# Patient Record
Sex: MALE | Race: WHITE | Employment: OTHER | URBAN - METROPOLITAN AREA
[De-identification: names, ages, dates, MRNs, and addresses within clinical notes are randomized per-mention and may not be internally consistent; named-entity substitution may affect disease eponyms.]

---

## 2024-05-30 ENCOUNTER — HOSPITAL ENCOUNTER (OUTPATIENT)
Dept: PET IMAGING | Age: 64
Discharge: HOME | End: 2024-05-30
Payer: OTHER GOVERNMENT

## 2024-05-30 DIAGNOSIS — C82.90 FOLLICULAR LYMPHOMA, UNSPECIFIED FOLLICULAR LYMPHOMA TYPE, UNSPECIFIED BODY REGION (HCC): ICD-10-CM

## 2024-05-30 PROCEDURE — 78815 PET IMAGE W/CT SKULL-THIGH: CPT

## 2024-05-30 PROCEDURE — A9609 HC RX DIAGNOSTIC RADIOPHARMACEUTICAL: HCPCS | Performed by: INTERNAL MEDICINE

## 2024-05-30 PROCEDURE — 2580000003 HC RX 258: Performed by: INTERNAL MEDICINE

## 2024-05-30 PROCEDURE — 3430000000 HC RX DIAGNOSTIC RADIOPHARMACEUTICAL: Performed by: INTERNAL MEDICINE

## 2024-05-30 RX ORDER — SODIUM CHLORIDE 0.9 % (FLUSH) 0.9 %
10 SYRINGE (ML) INJECTION PRN
Status: COMPLETED | OUTPATIENT
Start: 2024-05-30 | End: 2024-05-30

## 2024-05-30 RX ORDER — FLUDEOXYGLUCOSE F 18 200 MCI/ML
14.06 INJECTION, SOLUTION INTRAVENOUS
Status: COMPLETED | OUTPATIENT
Start: 2024-05-30 | End: 2024-05-30

## 2024-05-30 RX ADMIN — SODIUM CHLORIDE, PRESERVATIVE FREE 10 ML: 5 INJECTION INTRAVENOUS at 08:45

## 2024-05-30 RX ADMIN — FLUDEOXYGLUCOSE F 18 14.06 MILLICURIE: 200 INJECTION, SOLUTION INTRAVENOUS at 08:45

## 2025-02-12 DIAGNOSIS — R82.90 ABNORMAL URINE FINDING: ICD-10-CM

## 2025-02-12 DIAGNOSIS — I34.0 MITRAL VALVE INSUFFICIENCY, UNSPECIFIED ETIOLOGY: Primary | ICD-10-CM

## 2025-02-12 DIAGNOSIS — R07.9 CHEST PAIN, UNSPECIFIED TYPE: ICD-10-CM

## 2025-02-13 ENCOUNTER — HOSPITAL ENCOUNTER (OUTPATIENT)
Facility: HOSPITAL | Age: 65
Setting detail: SURGERY ADMIT
End: 2025-02-13
Attending: STUDENT IN AN ORGANIZED HEALTH CARE EDUCATION/TRAINING PROGRAM | Admitting: STUDENT IN AN ORGANIZED HEALTH CARE EDUCATION/TRAINING PROGRAM
Payer: OTHER GOVERNMENT

## 2025-02-13 DIAGNOSIS — I34.0 SEVERE MITRAL REGURGITATION: Primary | ICD-10-CM

## 2025-03-12 ENCOUNTER — HOSPITAL ENCOUNTER (INPATIENT)
Facility: HOSPITAL | Age: 65
DRG: 216 | End: 2025-03-12
Attending: STUDENT IN AN ORGANIZED HEALTH CARE EDUCATION/TRAINING PROGRAM | Admitting: STUDENT IN AN ORGANIZED HEALTH CARE EDUCATION/TRAINING PROGRAM
Payer: OTHER GOVERNMENT

## 2025-03-12 ENCOUNTER — APPOINTMENT (OUTPATIENT)
Dept: CARDIOLOGY | Facility: HOSPITAL | Age: 65
DRG: 216 | End: 2025-03-12
Payer: OTHER GOVERNMENT

## 2025-03-12 ENCOUNTER — APPOINTMENT (OUTPATIENT)
Dept: RADIOLOGY | Facility: HOSPITAL | Age: 65
DRG: 216 | End: 2025-03-12
Payer: OTHER GOVERNMENT

## 2025-03-12 DIAGNOSIS — Z98.890 S/P MVR (MITRAL VALVE REPAIR): ICD-10-CM

## 2025-03-12 DIAGNOSIS — I34.89 OTHER NONRHEUMATIC MITRAL VALVE DISORDERS: ICD-10-CM

## 2025-03-12 DIAGNOSIS — I34.0 MITRAL VALVE INSUFFICIENCY, UNSPECIFIED ETIOLOGY: ICD-10-CM

## 2025-03-12 DIAGNOSIS — Z01.810 ENCOUNTER FOR PREPROCEDURAL CARDIOVASCULAR EXAMINATION: ICD-10-CM

## 2025-03-12 DIAGNOSIS — I34.0 SEVERE MITRAL REGURGITATION: Primary | ICD-10-CM

## 2025-03-12 PROCEDURE — 1200000002 HC GENERAL ROOM WITH TELEMETRY DAILY

## 2025-03-12 PROCEDURE — 70355 PANORAMIC X-RAY OF JAWS: CPT | Performed by: RADIOLOGY

## 2025-03-12 PROCEDURE — 2500000005 HC RX 250 GENERAL PHARMACY W/O HCPCS: Performed by: NURSE PRACTITIONER

## 2025-03-12 PROCEDURE — 71046 X-RAY EXAM CHEST 2 VIEWS: CPT

## 2025-03-12 PROCEDURE — 2500000005 HC RX 250 GENERAL PHARMACY W/O HCPCS

## 2025-03-12 PROCEDURE — 99223 1ST HOSP IP/OBS HIGH 75: CPT | Performed by: NURSE PRACTITIONER

## 2025-03-12 PROCEDURE — 87081 CULTURE SCREEN ONLY: CPT | Performed by: NURSE PRACTITIONER

## 2025-03-12 PROCEDURE — 93005 ELECTROCARDIOGRAM TRACING: CPT

## 2025-03-12 PROCEDURE — 93010 ELECTROCARDIOGRAM REPORT: CPT | Performed by: INTERNAL MEDICINE

## 2025-03-12 PROCEDURE — 70355 PANORAMIC X-RAY OF JAWS: CPT

## 2025-03-12 PROCEDURE — 2500000004 HC RX 250 GENERAL PHARMACY W/ HCPCS (ALT 636 FOR OP/ED)

## 2025-03-12 RX ORDER — ENOXAPARIN SODIUM 100 MG/ML
40 INJECTION SUBCUTANEOUS EVERY 24 HOURS
Status: COMPLETED | OUTPATIENT
Start: 2025-03-13 | End: 2025-03-16

## 2025-03-12 RX ORDER — LANOLIN ALCOHOL/MO/W.PET/CERES
1000 CREAM (GRAM) TOPICAL DAILY
Status: DISCONTINUED | OUTPATIENT
Start: 2025-03-13 | End: 2025-03-18

## 2025-03-12 RX ORDER — CHOLECALCIFEROL (VITAMIN D3) 25 MCG
50 TABLET ORAL DAILY
COMMUNITY
Start: 2025-02-03

## 2025-03-12 RX ORDER — ACETAMINOPHEN 325 MG/1
650 TABLET ORAL EVERY 6 HOURS PRN
Status: DISCONTINUED | OUTPATIENT
Start: 2025-03-12 | End: 2025-03-18

## 2025-03-12 RX ORDER — DOCUSATE SODIUM 100 MG/1
100 CAPSULE, LIQUID FILLED ORAL 2 TIMES DAILY
Status: DISCONTINUED | OUTPATIENT
Start: 2025-03-12 | End: 2025-03-18

## 2025-03-12 RX ORDER — LANOLIN ALCOHOL/MO/W.PET/CERES
1000 CREAM (GRAM) TOPICAL DAILY
COMMUNITY
Start: 2025-02-11

## 2025-03-12 RX ORDER — TALC
3 POWDER (GRAM) TOPICAL NIGHTLY PRN
Status: DISCONTINUED | OUTPATIENT
Start: 2025-03-12 | End: 2025-03-18

## 2025-03-12 RX ORDER — MUPIROCIN 20 MG/G
0.5 OINTMENT TOPICAL 2 TIMES DAILY
Status: COMPLETED | OUTPATIENT
Start: 2025-03-12 | End: 2025-03-17

## 2025-03-12 RX ORDER — POLYETHYLENE GLYCOL 3350 17 G/17G
17 POWDER, FOR SOLUTION ORAL DAILY PRN
Status: DISCONTINUED | OUTPATIENT
Start: 2025-03-12 | End: 2025-03-18

## 2025-03-12 RX ORDER — CHOLECALCIFEROL (VITAMIN D3) 25 MCG
1000 TABLET ORAL DAILY
Status: DISCONTINUED | OUTPATIENT
Start: 2025-03-13 | End: 2025-03-18

## 2025-03-12 RX ADMIN — MUPIROCIN 0.5 APPLICATION: 20 OINTMENT TOPICAL at 20:47

## 2025-03-12 SDOH — ECONOMIC STABILITY: HOUSING INSECURITY: AT ANY TIME IN THE PAST 12 MONTHS, WERE YOU HOMELESS OR LIVING IN A SHELTER (INCLUDING NOW)?: NO

## 2025-03-12 SDOH — SOCIAL STABILITY: SOCIAL INSECURITY: WITHIN THE LAST YEAR, HAVE YOU BEEN HUMILIATED OR EMOTIONALLY ABUSED IN OTHER WAYS BY YOUR PARTNER OR EX-PARTNER?: NO

## 2025-03-12 SDOH — ECONOMIC STABILITY: FOOD INSECURITY: WITHIN THE PAST 12 MONTHS, THE FOOD YOU BOUGHT JUST DIDN'T LAST AND YOU DIDN'T HAVE MONEY TO GET MORE.: NEVER TRUE

## 2025-03-12 SDOH — SOCIAL STABILITY: SOCIAL INSECURITY: DO YOU FEEL ANYONE HAS EXPLOITED OR TAKEN ADVANTAGE OF YOU FINANCIALLY OR OF YOUR PERSONAL PROPERTY?: NO

## 2025-03-12 SDOH — SOCIAL STABILITY: SOCIAL INSECURITY
WITHIN THE LAST YEAR, HAVE YOU BEEN KICKED, HIT, SLAPPED, OR OTHERWISE PHYSICALLY HURT BY YOUR PARTNER OR EX-PARTNER?: NO

## 2025-03-12 SDOH — SOCIAL STABILITY: SOCIAL INSECURITY: DO YOU FEEL UNSAFE GOING BACK TO THE PLACE WHERE YOU ARE LIVING?: NO

## 2025-03-12 SDOH — SOCIAL STABILITY: SOCIAL INSECURITY: HAS ANYONE EVER THREATENED TO HURT YOUR FAMILY OR YOUR PETS?: NO

## 2025-03-12 SDOH — ECONOMIC STABILITY: HOUSING INSECURITY: IN THE PAST 12 MONTHS, HOW MANY TIMES HAVE YOU MOVED WHERE YOU WERE LIVING?: 0

## 2025-03-12 SDOH — ECONOMIC STABILITY: FOOD INSECURITY: HOW HARD IS IT FOR YOU TO PAY FOR THE VERY BASICS LIKE FOOD, HOUSING, MEDICAL CARE, AND HEATING?: NOT HARD AT ALL

## 2025-03-12 SDOH — SOCIAL STABILITY: SOCIAL INSECURITY: WITHIN THE LAST YEAR, HAVE YOU BEEN AFRAID OF YOUR PARTNER OR EX-PARTNER?: NO

## 2025-03-12 SDOH — SOCIAL STABILITY: SOCIAL INSECURITY
WITHIN THE LAST YEAR, HAVE YOU BEEN RAPED OR FORCED TO HAVE ANY KIND OF SEXUAL ACTIVITY BY YOUR PARTNER OR EX-PARTNER?: NO

## 2025-03-12 SDOH — SOCIAL STABILITY: SOCIAL INSECURITY: ARE YOU OR HAVE YOU BEEN THREATENED OR ABUSED PHYSICALLY, EMOTIONALLY, OR SEXUALLY BY ANYONE?: NO

## 2025-03-12 SDOH — ECONOMIC STABILITY: HOUSING INSECURITY: IN THE LAST 12 MONTHS, WAS THERE A TIME WHEN YOU WERE NOT ABLE TO PAY THE MORTGAGE OR RENT ON TIME?: NO

## 2025-03-12 SDOH — ECONOMIC STABILITY: INCOME INSECURITY: IN THE PAST 12 MONTHS HAS THE ELECTRIC, GAS, OIL, OR WATER COMPANY THREATENED TO SHUT OFF SERVICES IN YOUR HOME?: NO

## 2025-03-12 SDOH — SOCIAL STABILITY: SOCIAL INSECURITY: DOES ANYONE TRY TO KEEP YOU FROM HAVING/CONTACTING OTHER FRIENDS OR DOING THINGS OUTSIDE YOUR HOME?: NO

## 2025-03-12 SDOH — ECONOMIC STABILITY: FOOD INSECURITY: WITHIN THE PAST 12 MONTHS, YOU WORRIED THAT YOUR FOOD WOULD RUN OUT BEFORE YOU GOT THE MONEY TO BUY MORE.: NEVER TRUE

## 2025-03-12 SDOH — SOCIAL STABILITY: SOCIAL INSECURITY: ABUSE: ADULT

## 2025-03-12 SDOH — SOCIAL STABILITY: SOCIAL INSECURITY: WERE YOU ABLE TO COMPLETE ALL THE BEHAVIORAL HEALTH SCREENINGS?: YES

## 2025-03-12 SDOH — ECONOMIC STABILITY: TRANSPORTATION INSECURITY: IN THE PAST 12 MONTHS, HAS LACK OF TRANSPORTATION KEPT YOU FROM MEDICAL APPOINTMENTS OR FROM GETTING MEDICATIONS?: NO

## 2025-03-12 SDOH — SOCIAL STABILITY: SOCIAL INSECURITY: ARE THERE ANY APPARENT SIGNS OF INJURIES/BEHAVIORS THAT COULD BE RELATED TO ABUSE/NEGLECT?: NO

## 2025-03-12 SDOH — SOCIAL STABILITY: SOCIAL INSECURITY: HAVE YOU HAD ANY THOUGHTS OF HARMING ANYONE ELSE?: NO

## 2025-03-12 SDOH — SOCIAL STABILITY: SOCIAL INSECURITY: HAVE YOU HAD THOUGHTS OF HARMING ANYONE ELSE?: NO

## 2025-03-12 ASSESSMENT — ENCOUNTER SYMPTOMS
HEMATOLOGIC/LYMPHATIC NEGATIVE: 1
CARDIOVASCULAR NEGATIVE: 1
GASTROINTESTINAL NEGATIVE: 1
ABDOMINAL PAIN: 0
RESPIRATORY NEGATIVE: 1
PALPITATIONS: 0
PSYCHIATRIC NEGATIVE: 1
MUSCULOSKELETAL NEGATIVE: 1
EYES NEGATIVE: 1
ALLERGIC/IMMUNOLOGIC NEGATIVE: 1
FACIAL ASYMMETRY: 1
LIGHT-HEADEDNESS: 0
CONSTITUTIONAL NEGATIVE: 1
ENDOCRINE NEGATIVE: 1
DIZZINESS: 0
SHORTNESS OF BREATH: 0

## 2025-03-12 ASSESSMENT — COGNITIVE AND FUNCTIONAL STATUS - GENERAL
PATIENT BASELINE BEDBOUND: NO
DAILY ACTIVITIY SCORE: 24
MOBILITY SCORE: 24
MOBILITY SCORE: 24
DAILY ACTIVITIY SCORE: 24

## 2025-03-12 ASSESSMENT — ACTIVITIES OF DAILY LIVING (ADL)
HEARING - LEFT EAR: DIFFICULTY WITH NOISE
JUDGMENT_ADEQUATE_SAFELY_COMPLETE_DAILY_ACTIVITIES: YES
DRESSING YOURSELF: INDEPENDENT
ADEQUATE_TO_COMPLETE_ADL: YES
LACK_OF_TRANSPORTATION: NO
TOILETING: INDEPENDENT
HEARING - RIGHT EAR: FUNCTIONAL
PATIENT'S MEMORY ADEQUATE TO SAFELY COMPLETE DAILY ACTIVITIES?: YES
BATHING: INDEPENDENT
GROOMING: INDEPENDENT
WALKS IN HOME: INDEPENDENT
ASSISTIVE_DEVICE: EYEGLASSES
LACK_OF_TRANSPORTATION: NO
FEEDING YOURSELF: INDEPENDENT

## 2025-03-12 ASSESSMENT — LIFESTYLE VARIABLES
AUDIT-C TOTAL SCORE: 0
HOW OFTEN DO YOU HAVE A DRINK CONTAINING ALCOHOL: NEVER
HOW MANY STANDARD DRINKS CONTAINING ALCOHOL DO YOU HAVE ON A TYPICAL DAY: PATIENT DOES NOT DRINK
SKIP TO QUESTIONS 9-10: 1
AUDIT-C TOTAL SCORE: 0
HOW OFTEN DO YOU HAVE 6 OR MORE DRINKS ON ONE OCCASION: NEVER

## 2025-03-12 ASSESSMENT — PAIN SCALES - GENERAL
PAINLEVEL_OUTOF10: 0 - NO PAIN
PAINLEVEL_OUTOF10: 0 - NO PAIN

## 2025-03-12 ASSESSMENT — PATIENT HEALTH QUESTIONNAIRE - PHQ9
SUM OF ALL RESPONSES TO PHQ9 QUESTIONS 1 & 2: 0
2. FEELING DOWN, DEPRESSED OR HOPELESS: NOT AT ALL
1. LITTLE INTEREST OR PLEASURE IN DOING THINGS: NOT AT ALL

## 2025-03-12 ASSESSMENT — COLUMBIA-SUICIDE SEVERITY RATING SCALE - C-SSRS
6. HAVE YOU EVER DONE ANYTHING, STARTED TO DO ANYTHING, OR PREPARED TO DO ANYTHING TO END YOUR LIFE?: NO
2. HAVE YOU ACTUALLY HAD ANY THOUGHTS OF KILLING YOURSELF?: NO
1. IN THE PAST MONTH, HAVE YOU WISHED YOU WERE DEAD OR WISHED YOU COULD GO TO SLEEP AND NOT WAKE UP?: NO

## 2025-03-12 ASSESSMENT — PAIN - FUNCTIONAL ASSESSMENT: PAIN_FUNCTIONAL_ASSESSMENT: 0-10

## 2025-03-12 NOTE — CARE PLAN
Problem: Pain - Adult  Goal: Verbalizes/displays adequate comfort level or baseline comfort level  Outcome: Progressing     Problem: Safety - Adult  Goal: Free from fall injury  Outcome: Progressing     Problem: Discharge Planning  Goal: Discharge to home or other facility with appropriate resources  Outcome: Progressing     Problem: Chronic Conditions and Co-morbidities  Goal: Patient's chronic conditions and co-morbidity symptoms are monitored and maintained or improved  Outcome: Progressing     Problem: Nutrition  Goal: Nutrient intake appropriate for maintaining nutritional needs  Outcome: Progressing       The clinical goals for the shift include Patient will remain safe and free from falls throughout shift.  Renae Oshea RN

## 2025-03-12 NOTE — PROGRESS NOTES
Pharmacy Medication History Review    Rodney Mccabe is a 64 y.o. male admitted for Mitral regurgitation, acute. Pharmacy reviewed the patient's [id number] medications and allergies for accuracy.    Medications ADDED  All medications added to PTA list  Medications CHANGED  N/A  Medications REMOVED/NOT TAKING   N/A     The list below reflects the updated PTA list.   Prior to Admission Medications   Prescriptions Last Dose Informant   cholecalciferol (Vitamin D-3) 25 mcg (1000 units) tablet  Self   Sig: Take 2 tablets (50 mcg) by mouth once daily.   cyanocobalamin (Vitamin B-12) 1,000 mcg tablet  Self   Sig: Take 1 tablet (1,000 mcg) by mouth once daily.      Facility-Administered Medications: None       The list below reflects the updated allergy list. Please review each documented allergy for additional clarification and justification.  Allergies  Reviewed by Renae Oshea RN on 3/12/2025        Severity Reactions Comments    Benadryl [diphenhydramine Hcl] Not Specified Hives, Itching     Lactose Not Specified Diarrhea     Procaine Hcl Not Specified GI Upset, Nausea/vomiting             Patient declines M2B at discharge. Pharmacy has been updated to [facility].    Sources  [facility]  Pharmacy dispense history  Patient interview Good historian  Prescription bottles available at bedside for review  Care Everywhere   [facility]    Additional Comments  N/A    Rashid Woodruff, Destiny  [facility]  [id number]  [address], [address]  [place] [address]  Phone: [phone]  Please reach out via Secure Chat for questions, or if no response call iTaggit or Noknoker
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
## 2025-03-13 ENCOUNTER — APPOINTMENT (OUTPATIENT)
Dept: CARDIOLOGY | Facility: HOSPITAL | Age: 65
DRG: 216 | End: 2025-03-13
Payer: OTHER GOVERNMENT

## 2025-03-13 ENCOUNTER — APPOINTMENT (OUTPATIENT)
Dept: RESPIRATORY THERAPY | Facility: HOSPITAL | Age: 65
DRG: 216 | End: 2025-03-13
Payer: OTHER GOVERNMENT

## 2025-03-13 ENCOUNTER — APPOINTMENT (OUTPATIENT)
Dept: RADIOLOGY | Facility: HOSPITAL | Age: 65
DRG: 216 | End: 2025-03-13
Payer: OTHER GOVERNMENT

## 2025-03-13 ENCOUNTER — APPOINTMENT (OUTPATIENT)
Dept: VASCULAR MEDICINE | Facility: HOSPITAL | Age: 65
DRG: 216 | End: 2025-03-13
Payer: OTHER GOVERNMENT

## 2025-03-13 LAB
25(OH)D3 SERPL-MCNC: 50 NG/ML (ref 30–100)
ABO GROUP (TYPE) IN BLOOD: NORMAL
ALBUMIN SERPL BCP-MCNC: 4.1 G/DL (ref 3.4–5)
ALP SERPL-CCNC: 62 U/L (ref 33–136)
ALT SERPL W P-5'-P-CCNC: 11 U/L (ref 10–52)
ANION GAP SERPL CALC-SCNC: 13 MMOL/L (ref 10–20)
ANTIBODY SCREEN: NORMAL
AORTIC VALVE MEAN GRADIENT: 3 MMHG
AORTIC VALVE PEAK VELOCITY: 1.49 M/S
APPEARANCE UR: CLEAR
APTT PPP: 28 SECONDS (ref 26–36)
AST SERPL W P-5'-P-CCNC: 13 U/L (ref 9–39)
ATRIAL RATE: 65 BPM
AV PEAK GRADIENT: 9 MMHG
AVA (PEAK VEL): 2.32 CM2
AVA (VTI): 2.34 CM2
BASE EXCESS BLDA CALC-SCNC: 0 MMOL/L (ref -2–3)
BASOPHILS # BLD AUTO: 0.02 X10*3/UL (ref 0–0.1)
BASOPHILS NFR BLD AUTO: 0.6 %
BILIRUB SERPL-MCNC: 1.6 MG/DL (ref 0–1.2)
BILIRUB UR STRIP.AUTO-MCNC: NEGATIVE MG/DL
BODY TEMPERATURE: 37 DEGREES CELSIUS
BUN SERPL-MCNC: 14 MG/DL (ref 6–23)
CALCIUM SERPL-MCNC: 8.9 MG/DL (ref 8.6–10.6)
CHLORIDE SERPL-SCNC: 107 MMOL/L (ref 98–107)
CHOLEST SERPL-MCNC: 105 MG/DL (ref 0–199)
CHOLESTEROL/HDL RATIO: 3.3
CO2 SERPL-SCNC: 25 MMOL/L (ref 21–32)
COHGB MFR BLDA: 1.4 %
COLOR UR: COLORLESS
CREAT SERPL-MCNC: 0.78 MG/DL (ref 0.5–1.3)
DO-HGB MFR BLDA: 1.4 % (ref 0–5)
EGFRCR SERPLBLD CKD-EPI 2021: >90 ML/MIN/1.73M*2
EJECTION FRACTION APICAL 4 CHAMBER: 64.2
EJECTION FRACTION: 63 %
EOSINOPHIL # BLD AUTO: 0.1 X10*3/UL (ref 0–0.7)
EOSINOPHIL NFR BLD AUTO: 2.9 %
ERYTHROCYTE [DISTWIDTH] IN BLOOD BY AUTOMATED COUNT: 12.7 % (ref 11.5–14.5)
EST. AVERAGE GLUCOSE BLD GHB EST-MCNC: 82 MG/DL
GLUCOSE SERPL-MCNC: 83 MG/DL (ref 74–99)
GLUCOSE UR STRIP.AUTO-MCNC: NORMAL MG/DL
HBA1C MFR BLD: 4.5 %
HCO3 BLDA-SCNC: 23.9 MMOL/L (ref 22–26)
HCT VFR BLD AUTO: 40.8 % (ref 41–52)
HCV AB SER QL: REACTIVE
HDLC SERPL-MCNC: 31.9 MG/DL
HGB BLD-MCNC: 14.3 G/DL (ref 13.5–17.5)
HGB BLDA-MCNC: 15.3 G/DL (ref 13.5–17.5)
HOLD SPECIMEN: NORMAL
IMM GRANULOCYTES # BLD AUTO: 0 X10*3/UL (ref 0–0.7)
IMM GRANULOCYTES NFR BLD AUTO: 0 % (ref 0–0.9)
INR PPP: 1.1 (ref 0.9–1.1)
KETONES UR STRIP.AUTO-MCNC: NEGATIVE MG/DL
LDLC SERPL CALC-MCNC: 54 MG/DL
LEFT VENTRICLE INTERNAL DIMENSION DIASTOLE: 5.3 CM (ref 3.5–6)
LEFT VENTRICULAR OUTFLOW TRACT DIAMETER: 2.1 CM
LEUKOCYTE ESTERASE UR QL STRIP.AUTO: NEGATIVE
LYMPHOCYTES # BLD AUTO: 1.17 X10*3/UL (ref 1.2–4.8)
LYMPHOCYTES NFR BLD AUTO: 33.6 %
MAGNESIUM SERPL-MCNC: 2.06 MG/DL (ref 1.6–2.4)
MCH RBC QN AUTO: 32.1 PG (ref 26–34)
MCHC RBC AUTO-ENTMCNC: 35 G/DL (ref 32–36)
MCV RBC AUTO: 92 FL (ref 80–100)
METHGB MFR BLDA: 0.8 % (ref 0–1.5)
MGC ASCENT PFT - FEV1 - PRE: 3.74
MGC ASCENT PFT - FEV1 - PREDICTED: 3.12
MGC ASCENT PFT - FVC - PRE: 4.79
MGC ASCENT PFT - FVC - PREDICTED: 4.05
MITRAL VALVE E/A RATIO: 2.68
MONOCYTES # BLD AUTO: 0.42 X10*3/UL (ref 0.1–1)
MONOCYTES NFR BLD AUTO: 12.1 %
NEUTROPHILS # BLD AUTO: 1.77 X10*3/UL (ref 1.2–7.7)
NEUTROPHILS NFR BLD AUTO: 50.8 %
NITRITE UR QL STRIP.AUTO: NEGATIVE
NON HDL CHOLESTEROL: 73 MG/DL (ref 0–149)
NRBC BLD-RTO: 0 /100 WBCS (ref 0–0)
OXYHGB MFR BLDA: 96.4 % (ref 94–98)
P AXIS: 41 DEGREES
P OFFSET: 175 MS
P ONSET: 114 MS
PCO2 BLDA: 36 MM HG (ref 38–42)
PH BLDA: 7.43 PH (ref 7.38–7.42)
PH UR STRIP.AUTO: 6.5 [PH]
PLATELET # BLD AUTO: 159 X10*3/UL (ref 150–450)
PO2 BLDA: 101 MM HG (ref 85–95)
POTASSIUM SERPL-SCNC: 3.7 MMOL/L (ref 3.5–5.3)
PR INTERVAL: 212 MS
PROT SERPL-MCNC: 6 G/DL (ref 6.4–8.2)
PROT UR STRIP.AUTO-MCNC: NEGATIVE MG/DL
PROTHROMBIN TIME: 12.6 SECONDS (ref 9.8–12.4)
Q ONSET: 220 MS
QRS COUNT: 10 BEATS
QRS DURATION: 82 MS
QT INTERVAL: 416 MS
QTC CALCULATION(BAZETT): 432 MS
QTC FREDERICIA: 427 MS
R AXIS: 21 DEGREES
RBC # BLD AUTO: 4.45 X10*6/UL (ref 4.5–5.9)
RBC # UR STRIP.AUTO: NEGATIVE MG/DL
RH FACTOR (ANTIGEN D): NORMAL
RIGHT VENTRICLE FREE WALL PEAK S': 20.5 CM/S
RIGHT VENTRICLE PEAK SYSTOLIC PRESSURE: 15.1 MMHG
SAO2 % BLDA: 99 % (ref 94–100)
SODIUM SERPL-SCNC: 141 MMOL/L (ref 136–145)
SP GR UR STRIP.AUTO: 1.01
T AXIS: 16 DEGREES
T OFFSET: 428 MS
T4 FREE SERPL-MCNC: 0.99 NG/DL (ref 0.78–1.48)
TRICUSPID ANNULAR PLANE SYSTOLIC EXCURSION: 2.1 CM
TRIGL SERPL-MCNC: 94 MG/DL (ref 0–149)
TSH SERPL-ACNC: 4.64 MIU/L (ref 0.44–3.98)
UROBILINOGEN UR STRIP.AUTO-MCNC: NORMAL MG/DL
VENTRICULAR RATE: 65 BPM
VIT B12 SERPL-MCNC: 602 PG/ML (ref 211–911)
VLDL: 19 MG/DL (ref 0–40)
WBC # BLD AUTO: 3.5 X10*3/UL (ref 4.4–11.3)

## 2025-03-13 PROCEDURE — 2500000002 HC RX 250 W HCPCS SELF ADMINISTERED DRUGS (ALT 637 FOR MEDICARE OP, ALT 636 FOR OP/ED): Performed by: NURSE PRACTITIONER

## 2025-03-13 PROCEDURE — 83718 ASSAY OF LIPOPROTEIN: CPT | Performed by: NURSE PRACTITIONER

## 2025-03-13 PROCEDURE — 71250 CT THORAX DX C-: CPT

## 2025-03-13 PROCEDURE — 83735 ASSAY OF MAGNESIUM: CPT | Performed by: NURSE PRACTITIONER

## 2025-03-13 PROCEDURE — 82306 VITAMIN D 25 HYDROXY: CPT | Performed by: NURSE PRACTITIONER

## 2025-03-13 PROCEDURE — 82607 VITAMIN B-12: CPT | Performed by: NURSE PRACTITIONER

## 2025-03-13 PROCEDURE — 2500000004 HC RX 250 GENERAL PHARMACY W/ HCPCS (ALT 636 FOR OP/ED): Performed by: NURSE PRACTITIONER

## 2025-03-13 PROCEDURE — 82565 ASSAY OF CREATININE: CPT | Performed by: NURSE PRACTITIONER

## 2025-03-13 PROCEDURE — 71250 CT THORAX DX C-: CPT | Performed by: STUDENT IN AN ORGANIZED HEALTH CARE EDUCATION/TRAINING PROGRAM

## 2025-03-13 PROCEDURE — 2500000001 HC RX 250 WO HCPCS SELF ADMINISTERED DRUGS (ALT 637 FOR MEDICARE OP): Performed by: NURSE PRACTITIONER

## 2025-03-13 PROCEDURE — 81003 URINALYSIS AUTO W/O SCOPE: CPT | Performed by: NURSE PRACTITIONER

## 2025-03-13 PROCEDURE — 86850 RBC ANTIBODY SCREEN: CPT | Performed by: NURSE PRACTITIONER

## 2025-03-13 PROCEDURE — 83036 HEMOGLOBIN GLYCOSYLATED A1C: CPT | Performed by: NURSE PRACTITIONER

## 2025-03-13 PROCEDURE — 85610 PROTHROMBIN TIME: CPT | Performed by: NURSE PRACTITIONER

## 2025-03-13 PROCEDURE — 93880 EXTRACRANIAL BILAT STUDY: CPT

## 2025-03-13 PROCEDURE — 85018 HEMOGLOBIN: CPT

## 2025-03-13 PROCEDURE — 36600 WITHDRAWAL OF ARTERIAL BLOOD: CPT

## 2025-03-13 PROCEDURE — 84439 ASSAY OF FREE THYROXINE: CPT | Performed by: NURSE PRACTITIONER

## 2025-03-13 PROCEDURE — 84443 ASSAY THYROID STIM HORMONE: CPT | Performed by: NURSE PRACTITIONER

## 2025-03-13 PROCEDURE — 99231 SBSQ HOSP IP/OBS SF/LOW 25: CPT | Performed by: NURSE PRACTITIONER

## 2025-03-13 PROCEDURE — 86803 HEPATITIS C AB TEST: CPT | Performed by: NURSE PRACTITIONER

## 2025-03-13 PROCEDURE — 87522 HEPATITIS C REVRS TRNSCRPJ: CPT | Performed by: NURSE PRACTITIONER

## 2025-03-13 PROCEDURE — 85025 COMPLETE CBC W/AUTO DIFF WBC: CPT | Performed by: NURSE PRACTITIONER

## 2025-03-13 PROCEDURE — 1200000002 HC GENERAL ROOM WITH TELEMETRY DAILY

## 2025-03-13 PROCEDURE — 36415 COLL VENOUS BLD VENIPUNCTURE: CPT | Performed by: NURSE PRACTITIONER

## 2025-03-13 PROCEDURE — 94010 BREATHING CAPACITY TEST: CPT

## 2025-03-13 PROCEDURE — 93880 EXTRACRANIAL BILAT STUDY: CPT | Performed by: SURGERY

## 2025-03-13 PROCEDURE — B246ZZZ ULTRASONOGRAPHY OF RIGHT AND LEFT HEART: ICD-10-PCS | Performed by: INTERNAL MEDICINE

## 2025-03-13 PROCEDURE — 94010 BREATHING CAPACITY TEST: CPT | Performed by: INTERNAL MEDICINE

## 2025-03-13 PROCEDURE — 93306 TTE W/DOPPLER COMPLETE: CPT

## 2025-03-13 PROCEDURE — 93306 TTE W/DOPPLER COMPLETE: CPT | Performed by: INTERNAL MEDICINE

## 2025-03-13 RX ORDER — CHLORHEXIDINE GLUCONATE ORAL RINSE 1.2 MG/ML
15 SOLUTION DENTAL 2 TIMES DAILY
Status: COMPLETED | OUTPATIENT
Start: 2025-03-17 | End: 2025-03-18

## 2025-03-13 RX ORDER — POTASSIUM CHLORIDE 20 MEQ/1
40 TABLET, EXTENDED RELEASE ORAL ONCE
Status: COMPLETED | OUTPATIENT
Start: 2025-03-13 | End: 2025-03-13

## 2025-03-13 RX ADMIN — CYANOCOBALAMIN TAB 1000 MCG 1000 MCG: 1000 TAB at 08:04

## 2025-03-13 RX ADMIN — POTASSIUM CHLORIDE 40 MEQ: 1500 TABLET, EXTENDED RELEASE ORAL at 12:34

## 2025-03-13 RX ADMIN — ENOXAPARIN SODIUM 40 MG: 100 INJECTION SUBCUTANEOUS at 08:04

## 2025-03-13 RX ADMIN — MUPIROCIN 0.5 APPLICATION: 20 OINTMENT TOPICAL at 20:12

## 2025-03-13 RX ADMIN — Medication 1000 UNITS: at 08:04

## 2025-03-13 RX ADMIN — MUPIROCIN 0.5 APPLICATION: 20 OINTMENT TOPICAL at 08:04

## 2025-03-13 ASSESSMENT — COGNITIVE AND FUNCTIONAL STATUS - GENERAL
MOBILITY SCORE: 24
MOBILITY SCORE: 24
DAILY ACTIVITIY SCORE: 24
DAILY ACTIVITIY SCORE: 24

## 2025-03-13 ASSESSMENT — PAIN SCALES - GENERAL
PAINLEVEL_OUTOF10: 0 - NO PAIN
PAINLEVEL_OUTOF10: 0 - NO PAIN

## 2025-03-13 ASSESSMENT — PAIN - FUNCTIONAL ASSESSMENT
PAIN_FUNCTIONAL_ASSESSMENT: 0-10
PAIN_FUNCTIONAL_ASSESSMENT: 0-10

## 2025-03-13 NOTE — PROGRESS NOTES
03/13/25 1621   Discharge Planning   Living Arrangements Spouse/significant other   Support Systems Spouse/significant other   Type of Residence Private residence   Number of Stairs to Enter Residence 2   Number of Stairs Within Residence 12   Do you have animals or pets at home? Yes   Type of Animals or Pets 1 Cat   Home or Post Acute Services In home services   Type of Home Care Services Home nursing visits   Expected Discharge Disposition Home H   Does the patient need discharge transport arranged? No     Met with patient to introduce myself, role and discuss discharge planning.  Patient lives with his significant other.  Patient is independent  in all adl's.  Requires no assist devices for mobility.  Patient denies active home care, but understands that home care is to be initiated post discharge.  Patient denies any recent falls.  Patient feel safe at [facility] and denies any issues with making follow up appointments or getting his medications.  Patient has expressed no questions and no social work concerns.  Patient has been provided Your Heart Surgery booklet to review.  Family will transport home post discharge.  [facility]  [facility]  Home Care:  N/A  DME:  N/A

## 2025-03-13 NOTE — PROGRESS NOTES
"CARDIAC SURGERY DAILY PROGRESS NOTE    Rodney Mccabe is a 64 y.o. male, presenting with severe MR. Pt has a history of MVP, severe MR, non-obstructive CAD, treated Hep C, B-cell lymphoma 2004 and recurrence 2017, treated with radiation.   Dr Kimmie Ennis admitted patient 3/12/2025 in preparation for cardiac surgery. Plan is cardiac cath and then MV +/- CABG on 3/18.    Interval History:   Uneventful night    SUBJECTIVE:  No complaints    Objective   /75 (BP Location: Left arm, Patient Position: Lying)   Pulse 50   Temp 36.1 °C (97 °F) (Temporal)   Resp 16   Ht 1.753 m (5' 9\")   Wt 68.6 kg (151 lb 3.2 oz)   SpO2 97%   BMI 22.33 kg/m²   0-10 (Numeric) Pain Score: 0 - No pain   3 Day Weight Change: Unable to Calculate    Intake and Output    Intake/Output Summary (Last 24 hours) at 3/13/2025 1316  Last data filed at 3/13/2025 1230  Gross per 24 hour   Intake --   Output 850 ml   Net -850 ml       Physical Exam  Physical Exam  Vitals and nursing note reviewed.   Constitutional:       Appearance: Normal appearance.   HENT:      Head: Normocephalic and atraumatic.      Mouth/Throat:      Mouth: Mucous membranes are moist.   Eyes:      Conjunctiva/sclera: Conjunctivae normal.   Cardiovascular:      Rate and Rhythm: Regular rhythm.      Pulses: Normal pulses.      Heart sounds: Murmur heard.      Comments: TELE - SB-SR 50s-60s, occ PVCs  Pulmonary:      Effort: Pulmonary effort is normal.      Breath sounds: Normal breath sounds.   Abdominal:      General: Abdomen is flat. Bowel sounds are normal.      Palpations: Abdomen is soft.   Genitourinary:     Comments: Voiding independently  Musculoskeletal:      Cervical back: Neck supple.      Right lower leg: No edema.      Left lower leg: No edema.   Skin:     General: Skin is warm and dry.      Capillary Refill: Capillary refill takes less than 2 seconds.   Neurological:      General: No focal deficit present.      Mental Status: He is alert and oriented to person, " place, and time.      Cranial Nerves: Facial asymmetry (baseline L facial droop s/p remote TBI) present.   Psychiatric:         Mood and Affect: Mood normal.         Behavior: Behavior normal.         Thought Content: Thought content normal.         Judgment: Judgment normal.       Medications  Scheduled medications  [START ON 3/17/2025] chlorhexidine, 15 mL, Mouth/Throat, BID  cholecalciferol, 1,000 Units, oral, Daily  cyanocobalamin, 1,000 mcg, oral, Daily  docusate sodium, 100 mg, oral, BID  enoxaparin, 40 mg, subcutaneous, q24h  mupirocin, 0.5 Application, Topical, BID  perflutren lipid microspheres, 0.5-10 mL of dilution, intravenous, Once in imaging  perflutren protein A microsphere, 0.5 mL, intravenous, Once in imaging  sulfur hexafluoride microsphr, 2 mL, intravenous, Once in imaging    Continuous medications   PRN medications  PRN medications: acetaminophen, melatonin, oxygen, polyethylene glycol    Labs  Results for orders placed or performed during the hospital encounter of 03/12/25 (from the past 24 hours)   Electrocardiogram, 12-lead   Result Value Ref Range    Ventricular Rate 65 BPM    Atrial Rate 65 BPM    MT Interval 212 ms    QRS Duration 82 ms    QT Interval 416 ms    QTC Calculation(Bazett) 432 ms    P Axis 41 degrees    R Axis 21 degrees    T Axis 16 degrees    QRS Count 10 beats    Q Onset 220 ms    P Onset 114 ms    P Offset 175 ms    T Offset 428 ms    QTC Fredericia 427 ms   Hemoglobin A1C   Result Value Ref Range    Hemoglobin A1C 4.5 See comment %    Estimated Average Glucose 82 Not Established mg/dL   Coagulation Screen   Result Value Ref Range    Protime 12.6 (H) 9.8 - 12.4 seconds    INR 1.1 0.9 - 1.1    aPTT 28 26 - 36 seconds   CBC and Auto Differential   Result Value Ref Range    WBC 3.5 (L) 4.4 - 11.3 x10*3/uL    nRBC 0.0 0.0 - 0.0 /100 WBCs    RBC 4.45 (L) 4.50 - 5.90 x10*6/uL    Hemoglobin 14.3 13.5 - 17.5 g/dL    Hematocrit 40.8 (L) 41.0 - 52.0 %    MCV 92 80 - 100 fL    MCH 32.1  26.0 - 34.0 pg    MCHC 35.0 32.0 - 36.0 g/dL    RDW 12.7 11.5 - 14.5 %    Platelets 159 150 - 450 x10*3/uL    Neutrophils % 50.8 40.0 - 80.0 %    Immature Granulocytes %, Automated 0.0 0.0 - 0.9 %    Lymphocytes % 33.6 13.0 - 44.0 %    Monocytes % 12.1 2.0 - 10.0 %    Eosinophils % 2.9 0.0 - 6.0 %    Basophils % 0.6 0.0 - 2.0 %    Neutrophils Absolute 1.77 1.20 - 7.70 x10*3/uL    Immature Granulocytes Absolute, Automated 0.00 0.00 - 0.70 x10*3/uL    Lymphocytes Absolute 1.17 (L) 1.20 - 4.80 x10*3/uL    Monocytes Absolute 0.42 0.10 - 1.00 x10*3/uL    Eosinophils Absolute 0.10 0.00 - 0.70 x10*3/uL    Basophils Absolute 0.02 0.00 - 0.10 x10*3/uL   Comprehensive Metabolic Panel   Result Value Ref Range    Glucose 83 74 - 99 mg/dL    Sodium 141 136 - 145 mmol/L    Potassium 3.7 3.5 - 5.3 mmol/L    Chloride 107 98 - 107 mmol/L    Bicarbonate 25 21 - 32 mmol/L    Anion Gap 13 10 - 20 mmol/L    Urea Nitrogen 14 6 - 23 mg/dL    Creatinine 0.78 0.50 - 1.30 mg/dL    eGFR >90 >60 mL/min/1.73m*2    Calcium 8.9 8.6 - 10.6 mg/dL    Albumin 4.1 3.4 - 5.0 g/dL    Alkaline Phosphatase 62 33 - 136 U/L    Total Protein 6.0 (L) 6.4 - 8.2 g/dL    AST 13 9 - 39 U/L    Bilirubin, Total 1.6 (H) 0.0 - 1.2 mg/dL    ALT 11 10 - 52 U/L   Lipid Panel   Result Value Ref Range    Cholesterol 105 0 - 199 mg/dL    HDL-Cholesterol 31.9 mg/dL    Cholesterol/HDL Ratio 3.3     LDL Calculated 54 <=99 mg/dL    VLDL 19 0 - 40 mg/dL    Triglycerides 94 0 - 149 mg/dL    Non HDL Cholesterol 73 0 - 149 mg/dL   Type And Screen   Result Value Ref Range    ABO TYPE O     Rh TYPE POS     ANTIBODY SCREEN NEG    Urinalysis with Reflex Culture and Microscopic   Result Value Ref Range    Color, Urine Colorless (N) Light-Yellow, Yellow, Dark-Yellow    Appearance, Urine Clear Clear    Specific Gravity, Urine 1.007 1.005 - 1.035    pH, Urine 6.5 5.0, 5.5, 6.0, 6.5, 7.0, 7.5, 8.0    Protein, Urine NEGATIVE NEGATIVE, 10 (TRACE), 20 (TRACE) mg/dL    Glucose, Urine Normal  Normal mg/dL    Blood, Urine NEGATIVE NEGATIVE mg/dL    Ketones, Urine NEGATIVE NEGATIVE mg/dL    Bilirubin, Urine NEGATIVE NEGATIVE mg/dL    Urobilinogen, Urine Normal Normal mg/dL    Nitrite, Urine NEGATIVE NEGATIVE    Leukocyte Esterase, Urine NEGATIVE NEGATIVE   Magnesium   Result Value Ref Range    Magnesium 2.06 1.60 - 2.40 mg/dL   TSH with reflex to Free T4 if abnormal   Result Value Ref Range    Thyroid Stimulating Hormone 4.64 (H) 0.44 - 3.98 mIU/L   Hepatitis C antibody   Result Value Ref Range    Hepatitis C AB Reactive (A) Nonreactive   Vitamin D 25-Hydroxy,Total (for eval of Vitamin D levels)   Result Value Ref Range    Vitamin D, 25-Hydroxy, Total 50 30 - 100 ng/mL   Vitamin B12   Result Value Ref Range    Vitamin B12 602 211 - 911 pg/mL   Thyroxine, Free   Result Value Ref Range    Thyroxine, Free 0.99 0.78 - 1.48 ng/dL   Blood Gas Arterial, COOX Unsolicited   Result Value Ref Range    POCT pH, Arterial 7.43 (H) 7.38 - 7.42 pH    POCT pCO2, Arterial 36 (L) 38 - 42 mm Hg    POCT pO2, Arterial 101 (H) 85 - 95 mm Hg    POCT SO2, Arterial 99 94 - 100 %    POCT Oxy Hemoglobin, Arterial 96.4 94.0 - 98.0 %    POCT Base Excess, Arterial 0.0 -2.0 - 3.0 mmol/L    POCT HCO3 Calculated, Arterial 23.9 22.0 - 26.0 mmol/L    POCT Hemoglobin, Arterial 15.3 13.5 - 17.5 g/dL    POCT Carboxyhemoglobin, Arterial 1.4 %    POCT Methemoglobin, Arterial 0.8 0.0 - 1.5 %    POCT Deoxy Hemoglobin, Arterial 1.4 0.0 - 5.0 %    Patient Temperature 37.0 degrees Celsius   Pulmonary function testing   Result Value Ref Range    FVC - Predicted 4.05     FEV1 - Predicted 3.12     FVC - PRE 4.79     FEV1 - Pre 3.74    Transthoracic Echo (TTE) Complete   Result Value Ref Range    BSA 1.83 m2     IMAGING/ DIAGNOSTIC TESTING:  I have personally reviewed the following test result(s):      XR chest 2 views 03/12/2025  Impression  No acute cardiopulmonary process.    Transthoracic Echo (TTE) Complete 03/13/2025  CONCLUSIONS:  1. Left  ventricular ejection fraction is normal, by visual estimate at 60-65%.  2. Left ventricular cavity size is moderately dilated.  3. There is normal right ventricular global systolic function.  4. The left atrial size is severely dilated.  5. Prolapse of the posterior MV leaflet is noted with a severe MR jet directed anteriorly and laterally. Total regurg volume by PISA as we well as by Volumetric method is > 50 ml and fraction is 50%.  6. Severe mitral valve regurgitation.  7. Right ventricular systolic pressure is within normal limits.    3/13/2025 Carotid Duplex  PRELIMINARY CONCLUSIONS:  Right Carotid: The right proximal internal carotid artery is normal. Right external carotid artery appears patent with no evidence of stenosis. The right vertebral artery is patent with antegrade flow. No evidence of hemodynamically significant stenosis in the right subclavian artery.  Left Carotid: The left proximal internal carotid artery is normal. Left external carotid artery appears patent with no evidence of stenosis. The left vertebral artery is patent with antegrade flow. No evidence of hemodynamically significant stenosis in the left subclavian artery.    3/12/2025 Panorex  FINDINGS:  No fracture or dislocation is evident. The patient is missing teeth.  No definitive apical/periapical lucency is evident. Sinuses are clear.        ===============  IMPRESSION & PLAN:  ===============  Preop mitral valve surgery +/- CABG scheduled for 3/18 with Dr Kimmie Ennis  - activity and pulm toilet  - Optimize nutrition and electrolytes  - 2v CXR 3/13  - echo 3/13  - CT chest ordered and pending  - cardiac cath planned for 3/13 afternoon  - panorex 3/12; carotids, curtis/ABG 3/13  - MRSA pending; UA negative  - If needs CABG: add ASA, high-intensity statin, and BB (or document contraindications for use)   - No ACEi/ARBs in the pre-op period (at least 48 hours)  - Hold any SGLT2 inhibitors (Farxiga, Jardiance, etc.) for at least 3 days  prior to cardiac surgery to prevent euglycemic DKA  - Hold any daily GLP-1 agonist drugs on the day of surgery. Hold any weekly GLP-1 drugs for 7 days prior to surgery. (Dulaglutide, Exenatide, Liraglutide, Lixisenatide, & Semaglutide)  - No antiplatelets other than ASA, no anticoagulants other than Heparin  - NPO after midnight, blood on hold/T&S, and preop scrubs ordered    Rhythm  - Tele: SR  - Tele until discharge    Volume/Electrolyte Status: Preop wt Weight: 68.6 kg (151 lb 3.2 oz)   Vitals:    03/12/25 1554   Weight: 68.6 kg (151 lb 3.2 oz)   - Replete electrolytes for hypokalemia/hypomagnesemia/hypophosphatemia as needed - 3/13 replaced K  - Daily weights and I&Os    Nutrition  - 3/13 Vit D and B-12 WNL  - continue home Vit D  - continue home B-12     H/o treated Hep C  - AB +, reflex PCR in progress     H/o B-cell lymphoma   Follicular lymphoma   - s/p 4 cycles of R-CHOP 2006   - 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 Follicular lymphoma: Prior lymphadenopathy resolved. No obvious palpable   adenopathy today and no concerning symptoms. At this point he will remain on   surveillance and we will see him back again in 6 months.  From an oncology point of view okay to proceed with his mitral valve surgery.  Return to clinic in 6 months     VTE Prophylaxis: SCDs/TEDs, ambulation, SQ lovenox (will hold preop)  Code Status: Full Code    Dispo  - receives usual care through [facility]  - PT/OT to assess postop    JORDI Mandel-CNP  Cardiac Surgery [facility]  [facility]  Team Phone [phone]

## 2025-03-13 NOTE — CARE PLAN
The patient's goals for the shift include      The clinical goals for the shift include Patient will remain hemodynamically stable throughout the shift.    Patient will remain safe throughout the shift.

## 2025-03-13 NOTE — CARE PLAN
Problem: Pain - Adult  Goal: Verbalizes/displays adequate comfort level or baseline comfort level  Outcome: Progressing     Problem: Safety - Adult  Goal: Free from fall injury  Outcome: Progressing     Problem: Discharge Planning  Goal: Discharge to home or other facility with appropriate resources  Outcome: Progressing     Problem: Chronic Conditions and Co-morbidities  Goal: Patient's chronic conditions and co-morbidity symptoms are monitored and maintained or improved  Outcome: Progressing     Problem: Nutrition  Goal: Nutrient intake appropriate for maintaining nutritional needs  Outcome: Progressing       The clinical goals for the shift include Patient will remain hemodynamically stable throughout shift.  Renae Oshea RN

## 2025-03-14 PROBLEM — Z87.19 H/O RIGHT INGUINAL HERNIA REPAIR: Chronic | Status: ACTIVE | Noted: 2025-03-14

## 2025-03-14 PROBLEM — Z98.890 H/O RIGHT INGUINAL HERNIA REPAIR: Chronic | Status: ACTIVE | Noted: 2025-03-14

## 2025-03-14 PROBLEM — Z98.890 H/O RIGHT INGUINAL HERNIA REPAIR: Chronic | Status: RESOLVED | Noted: 2025-03-14 | Resolved: 2025-03-14

## 2025-03-14 PROBLEM — Z87.19 H/O RIGHT INGUINAL HERNIA REPAIR: Chronic | Status: RESOLVED | Noted: 2025-03-14 | Resolved: 2025-03-14

## 2025-03-14 LAB
ALBUMIN SERPL BCP-MCNC: 4.1 G/DL (ref 3.4–5)
ANION GAP SERPL CALC-SCNC: 13 MMOL/L (ref 10–20)
BUN SERPL-MCNC: 16 MG/DL (ref 6–23)
CALCIUM SERPL-MCNC: 9 MG/DL (ref 8.6–10.6)
CHLORIDE SERPL-SCNC: 106 MMOL/L (ref 98–107)
CO2 SERPL-SCNC: 24 MMOL/L (ref 21–32)
CREAT SERPL-MCNC: 0.85 MG/DL (ref 0.5–1.3)
EGFRCR SERPLBLD CKD-EPI 2021: >90 ML/MIN/1.73M*2
ERYTHROCYTE [DISTWIDTH] IN BLOOD BY AUTOMATED COUNT: 12.9 % (ref 11.5–14.5)
GLUCOSE SERPL-MCNC: 78 MG/DL (ref 74–99)
HCT VFR BLD AUTO: 43 % (ref 41–52)
HCV RNA SERPL NAA+PROBE-ACNC: NOT DETECTED K[IU]/ML
HCV RNA SERPL NAA+PROBE-LOG IU: NORMAL {LOG_IU}/ML
HGB BLD-MCNC: 14.7 G/DL (ref 13.5–17.5)
MAGNESIUM SERPL-MCNC: 2.02 MG/DL (ref 1.6–2.4)
MCH RBC QN AUTO: 32 PG (ref 26–34)
MCHC RBC AUTO-ENTMCNC: 34.2 G/DL (ref 32–36)
MCV RBC AUTO: 94 FL (ref 80–100)
NRBC BLD-RTO: 0 /100 WBCS (ref 0–0)
PHOSPHATE SERPL-MCNC: 2.8 MG/DL (ref 2.5–4.9)
PLATELET # BLD AUTO: 149 X10*3/UL (ref 150–450)
POTASSIUM SERPL-SCNC: 4.1 MMOL/L (ref 3.5–5.3)
RBC # BLD AUTO: 4.59 X10*6/UL (ref 4.5–5.9)
SODIUM SERPL-SCNC: 139 MMOL/L (ref 136–145)
STAPHYLOCOCCUS SPEC CULT: NORMAL
WBC # BLD AUTO: 3.7 X10*3/UL (ref 4.4–11.3)

## 2025-03-14 PROCEDURE — 2500000004 HC RX 250 GENERAL PHARMACY W/ HCPCS (ALT 636 FOR OP/ED): Performed by: INTERNAL MEDICINE

## 2025-03-14 PROCEDURE — 2720000007 HC OR 272 NO HCPCS: Performed by: INTERNAL MEDICINE

## 2025-03-14 PROCEDURE — 4A023N7 MEASUREMENT OF CARDIAC SAMPLING AND PRESSURE, LEFT HEART, PERCUTANEOUS APPROACH: ICD-10-PCS | Performed by: INTERNAL MEDICINE

## 2025-03-14 PROCEDURE — 83735 ASSAY OF MAGNESIUM: CPT | Performed by: NURSE PRACTITIONER

## 2025-03-14 PROCEDURE — 93458 L HRT ARTERY/VENTRICLE ANGIO: CPT | Performed by: INTERNAL MEDICINE

## 2025-03-14 PROCEDURE — C1894 INTRO/SHEATH, NON-LASER: HCPCS | Performed by: INTERNAL MEDICINE

## 2025-03-14 PROCEDURE — 2500000001 HC RX 250 WO HCPCS SELF ADMINISTERED DRUGS (ALT 637 FOR MEDICARE OP): Performed by: NURSE PRACTITIONER

## 2025-03-14 PROCEDURE — 80069 RENAL FUNCTION PANEL: CPT | Performed by: NURSE PRACTITIONER

## 2025-03-14 PROCEDURE — 1200000002 HC GENERAL ROOM WITH TELEMETRY DAILY

## 2025-03-14 PROCEDURE — 36415 COLL VENOUS BLD VENIPUNCTURE: CPT | Performed by: NURSE PRACTITIONER

## 2025-03-14 PROCEDURE — 2500000005 HC RX 250 GENERAL PHARMACY W/O HCPCS: Performed by: INTERNAL MEDICINE

## 2025-03-14 PROCEDURE — 2500000004 HC RX 250 GENERAL PHARMACY W/ HCPCS (ALT 636 FOR OP/ED): Performed by: NURSE PRACTITIONER

## 2025-03-14 PROCEDURE — 85027 COMPLETE CBC AUTOMATED: CPT | Performed by: NURSE PRACTITIONER

## 2025-03-14 PROCEDURE — 2550000001 HC RX 255 CONTRASTS: Performed by: INTERNAL MEDICINE

## 2025-03-14 PROCEDURE — C1760 CLOSURE DEV, VASC: HCPCS | Performed by: INTERNAL MEDICINE

## 2025-03-14 PROCEDURE — 99232 SBSQ HOSP IP/OBS MODERATE 35: CPT | Performed by: NURSE PRACTITIONER

## 2025-03-14 PROCEDURE — B2111ZZ FLUOROSCOPY OF MULTIPLE CORONARY ARTERIES USING LOW OSMOLAR CONTRAST: ICD-10-PCS | Performed by: INTERNAL MEDICINE

## 2025-03-14 RX ORDER — MIDAZOLAM HYDROCHLORIDE 1 MG/ML
INJECTION, SOLUTION INTRAMUSCULAR; INTRAVENOUS AS NEEDED
Status: DISCONTINUED | OUTPATIENT
Start: 2025-03-14 | End: 2025-03-14 | Stop reason: HOSPADM

## 2025-03-14 RX ORDER — VERAPAMIL HYDROCHLORIDE 2.5 MG/ML
INJECTION, SOLUTION INTRAVENOUS AS NEEDED
Status: DISCONTINUED | OUTPATIENT
Start: 2025-03-14 | End: 2025-03-14 | Stop reason: HOSPADM

## 2025-03-14 RX ORDER — LIDOCAINE HYDROCHLORIDE 10 MG/ML
INJECTION, SOLUTION EPIDURAL; INFILTRATION; INTRACAUDAL; PERINEURAL AS NEEDED
Status: DISCONTINUED | OUTPATIENT
Start: 2025-03-14 | End: 2025-03-14 | Stop reason: HOSPADM

## 2025-03-14 RX ORDER — HEPARIN SODIUM 1000 [USP'U]/ML
INJECTION, SOLUTION INTRAVENOUS; SUBCUTANEOUS AS NEEDED
Status: DISCONTINUED | OUTPATIENT
Start: 2025-03-14 | End: 2025-03-14 | Stop reason: HOSPADM

## 2025-03-14 RX ORDER — FENTANYL CITRATE 50 UG/ML
INJECTION, SOLUTION INTRAMUSCULAR; INTRAVENOUS AS NEEDED
Status: DISCONTINUED | OUTPATIENT
Start: 2025-03-14 | End: 2025-03-14 | Stop reason: HOSPADM

## 2025-03-14 RX ADMIN — MUPIROCIN 0.5 APPLICATION: 20 OINTMENT TOPICAL at 20:29

## 2025-03-14 RX ADMIN — DOCUSATE SODIUM 100 MG: 100 CAPSULE, LIQUID FILLED ORAL at 08:52

## 2025-03-14 RX ADMIN — CYANOCOBALAMIN TAB 1000 MCG 1000 MCG: 1000 TAB at 08:52

## 2025-03-14 RX ADMIN — Medication 1000 UNITS: at 08:52

## 2025-03-14 RX ADMIN — ENOXAPARIN SODIUM 40 MG: 100 INJECTION SUBCUTANEOUS at 08:52

## 2025-03-14 RX ADMIN — MUPIROCIN 0.5 APPLICATION: 20 OINTMENT TOPICAL at 08:52

## 2025-03-14 ASSESSMENT — PAIN SCALES - GENERAL
PAINLEVEL_OUTOF10: 0 - NO PAIN

## 2025-03-14 ASSESSMENT — COGNITIVE AND FUNCTIONAL STATUS - GENERAL
MOBILITY SCORE: 24
DAILY ACTIVITIY SCORE: 24

## 2025-03-14 ASSESSMENT — PAIN - FUNCTIONAL ASSESSMENT
PAIN_FUNCTIONAL_ASSESSMENT: 0-10

## 2025-03-14 NOTE — POST-PROCEDURE NOTE
Physician Transition of Care Summary  Invasive Cardiovascular Lab    Procedure Date: 3/14/2025  Attending:    * Aaliyah Morgan - Primary  Resident/Fellow/Other Assistant: Surgeons and Role:     * Aaliyah Augustin MD - Fellow     * Gary Mancia MD - Fellow    Indications:   Pre-op Diagnosis      * Mitral valve insufficiency, unspecified etiology [I34.0]    Post-procedure diagnosis:   Post-op Diagnosis     * Mitral valve insufficiency, unspecified etiology [I34.0]    Procedure(s):   Left Heart Cath  94330 - KS L HRT CATH W/NJX L VENTRICULOGRAPHY IMG S&I        Procedure Findings:   LHC  LM: no significant disease  LAD: dual system with 30% mid stenosis  LCX: no significant disease, OM1 has 50% proximal stenosis, OM2 has 40% mid stenosis   RCA: dominant, 20% mid stenosis    Description of the Procedure:   RRA 6f- TR band    Complications:   none    Stents/Implants:   Implants       No implant documentation for this case.            Anticoagulation/Antiplatelet Plan:   ASA    Estimated Blood Loss:   2 mL    Anesthesia: Moderate Sedation Anesthesia Staff: No anesthesia staff entered.    Any Specimen(s) Removed:   No specimens collected during this procedure.    Disposition:   Floor      Electronically signed by: Aaliyah Augustin MD, 3/14/2025 5:15 PM

## 2025-03-14 NOTE — PROGRESS NOTES
"CARDIAC SURGERY DAILY PROGRESS NOTE    Rodney Mccabe is a 64 y.o. male, presenting with severe MR. Pt has a history of MVP, severe MR, non-obstructive CAD, treated Hep C, B-cell lymphoma 2004 and recurrence 2017, treated with radiation.   Dr Kimmie Ennis admitted patient 3/12/2025 in preparation for cardiac surgery. Plan is cardiac cath and then MV +/- CABG on 3/18.    Interval History:   Uneventful night    SUBJECTIVE:  No complaints    Objective   /76 (BP Location: Left arm, Patient Position: Lying)   Pulse 54   Temp 36 °C (96.8 °F) (Temporal)   Resp 18   Ht 1.753 m (5' 9\")   Wt 68.6 kg (151 lb 3.2 oz)   SpO2 97%   BMI 22.33 kg/m²   0-10 (Numeric) Pain Score: 0 - No pain   3 Day Weight Change: Unable to Calculate    Intake and Output    Intake/Output Summary (Last 24 hours) at 3/14/2025 1401  Last data filed at 3/14/2025 1300  Gross per 24 hour   Intake 0 ml   Output 1450 ml   Net -1450 ml       Physical Exam  Physical Exam  Vitals and nursing note reviewed.   Constitutional:       Appearance: Normal appearance.   HENT:      Head: Normocephalic and atraumatic.      Mouth/Throat:      Mouth: Mucous membranes are moist.   Eyes:      Conjunctiva/sclera: Conjunctivae normal.   Cardiovascular:      Rate and Rhythm: Regular rhythm.      Pulses: Normal pulses.      Heart sounds: Murmur heard.      Comments: TELE - SB-SR 50s-60s, occ PVCs  Pulmonary:      Effort: Pulmonary effort is normal.      Breath sounds: Normal breath sounds.   Abdominal:      General: Abdomen is flat. Bowel sounds are normal.      Palpations: Abdomen is soft.   Genitourinary:     Comments: Voiding independently  Musculoskeletal:      Cervical back: Neck supple.      Right lower leg: No edema.      Left lower leg: No edema.   Skin:     General: Skin is warm and dry.      Capillary Refill: Capillary refill takes less than 2 seconds.   Neurological:      General: No focal deficit present.      Mental Status: He is alert and oriented to " person, place, and time.      Cranial Nerves: Facial asymmetry (baseline L facial droop s/p remote TBI) present.   Psychiatric:         Mood and Affect: Mood normal.         Behavior: Behavior normal.         Thought Content: Thought content normal.         Judgment: Judgment normal.       Medications  Scheduled medications  [START ON 3/17/2025] chlorhexidine, 15 mL, Mouth/Throat, BID  cholecalciferol, 1,000 Units, oral, Daily  cyanocobalamin, 1,000 mcg, oral, Daily  docusate sodium, 100 mg, oral, BID  enoxaparin, 40 mg, subcutaneous, q24h  mupirocin, 0.5 Application, Topical, BID    Continuous medications   PRN medications  PRN medications: acetaminophen, melatonin, oxygen, polyethylene glycol    Labs  Results for orders placed or performed during the hospital encounter of 03/12/25 (from the past 24 hours)   Extra Urine Gray Tube   Result Value Ref Range    Extra Tube Hold for add-ons.    CBC   Result Value Ref Range    WBC 3.7 (L) 4.4 - 11.3 x10*3/uL    nRBC 0.0 0.0 - 0.0 /100 WBCs    RBC 4.59 4.50 - 5.90 x10*6/uL    Hemoglobin 14.7 13.5 - 17.5 g/dL    Hematocrit 43.0 41.0 - 52.0 %    MCV 94 80 - 100 fL    MCH 32.0 26.0 - 34.0 pg    MCHC 34.2 32.0 - 36.0 g/dL    RDW 12.9 11.5 - 14.5 %    Platelets 149 (L) 150 - 450 x10*3/uL   Renal Function Panel   Result Value Ref Range    Glucose 78 74 - 99 mg/dL    Sodium 139 136 - 145 mmol/L    Potassium 4.1 3.5 - 5.3 mmol/L    Chloride 106 98 - 107 mmol/L    Bicarbonate 24 21 - 32 mmol/L    Anion Gap 13 10 - 20 mmol/L    Urea Nitrogen 16 6 - 23 mg/dL    Creatinine 0.85 0.50 - 1.30 mg/dL    eGFR >90 >60 mL/min/1.73m*2    Calcium 9.0 8.6 - 10.6 mg/dL    Phosphorus 2.8 2.5 - 4.9 mg/dL    Albumin 4.1 3.4 - 5.0 g/dL   Magnesium   Result Value Ref Range    Magnesium 2.02 1.60 - 2.40 mg/dL     IMAGING/ DIAGNOSTIC TESTING:  I have personally reviewed the following test result(s):      XR chest 2 views 03/12/2025  Impression  No acute cardiopulmonary process.    Transthoracic Echo  (TTE) Complete 03/13/2025  CONCLUSIONS:  1. Left ventricular ejection fraction is normal, by visual estimate at 60-65%.  2. Left ventricular cavity size is moderately dilated.  3. There is normal right ventricular global systolic function.  4. The left atrial size is severely dilated.  5. Prolapse of the posterior MV leaflet is noted with a severe MR jet directed anteriorly and laterally. Total regurg volume by PISA as we well as by Volumetric method is > 50 ml and fraction is 50%.  6. Severe mitral valve regurgitation.  7. Right ventricular systolic pressure is within normal limits.    3/13/2025 Carotid Duplex  PRELIMINARY CONCLUSIONS:  Right Carotid: The right proximal internal carotid artery is normal. Right external carotid artery appears patent with no evidence of stenosis. The right vertebral artery is patent with antegrade flow. No evidence of hemodynamically significant stenosis in the right subclavian artery.  Left Carotid: The left proximal internal carotid artery is normal. Left external carotid artery appears patent with no evidence of stenosis. The left vertebral artery is patent with antegrade flow. No evidence of hemodynamically significant stenosis in the left subclavian artery.    3/12/2025 Panorex  FINDINGS:  No fracture or dislocation is evident. The patient is missing teeth.  No definitive apical/periapical lucency is evident. Sinuses are clear.    CT chest wo IV contrast 03/13/2025  Impression  1. Bilateral axillary lymphadenopathy, which was described on prior  PET MR from 05/30/2024, and likely correlates with patient's history  of lymphoma. Please refer to prior outside imaging for direct  comparison.  2. Partially visualized cystic lesion within superior pole of the  left kidney. This lesion was previously characterized on prior PET  exam from 05/30/2024, and described as a complex cyst. Recommend  comparison with available outside imaging and consider non-emergent  renal protocol CT or MRI  for further evaluation.  3. Mild scattered calcific atherosclerotic disease of the thoracic  aorta and coronary arteries.  4. Chronic appearing compression fracture of the T8 vertebrae without  retropulsion.            ===============  IMPRESSION & PLAN:  ===============  Preop mitral valve surgery +/- CABG scheduled for 3/18 with Dr Kimmie Ennis  - activity and pulm toilet  - Optimize nutrition and electrolytes  - 2v CXR 3/13  - echo 3/13  - CT chest ordered and pending  - cardiac cath planned for 3/14 afternoon  - panorex 3/12; carotids, curtis/ABG 3/13  - MRSA pending; UA negative  - If needs CABG: add ASA, high-intensity statin, and BB (or document contraindications for use)   - No ACEi/ARBs in the pre-op period (at least 48 hours)  - Hold any SGLT2 inhibitors (Farxiga, Jardiance, etc.) for at least 3 days prior to cardiac surgery to prevent euglycemic DKA  - Hold any daily GLP-1 agonist drugs on the day of surgery. Hold any weekly GLP-1 drugs for 7 days prior to surgery. (Dulaglutide, Exenatide, Liraglutide, Lixisenatide, & Semaglutide)  - No antiplatelets other than ASA, no anticoagulants other than Heparin  - NPO after midnight, blood on hold/T&S, and preop scrubs ordered    Rhythm  - Tele: SR  - Tele until discharge    Volume/Electrolyte Status: Preop wt Weight: 68.6 kg (151 lb 3.2 oz)   Vitals:    03/12/25 1554   Weight: 68.6 kg (151 lb 3.2 oz)   - Replete electrolytes for hypokalemia/hypomagnesemia/hypophosphatemia as needed - 3/13 replaced K  - Daily weights and I&Os    Nutrition  - 3/13 Vit D and B-12 WNL  - continue home Vit D  - continue home B-12     H/o treated Hep C  - AB +, reflex PCR in progress     H/o B-cell lymphoma   Follicular lymphoma   - s/p 4 cycles of R-CHOP 2006   - recurrence of low grade follicular lymphoma at left gingiva s/p Radiation 2008   left mandible to a dose of 2700 cGy in 15 fractions of 180 cGy on 9/2008  -Recurrence in neck status post radiation therapy to the right neck 200  cGy day   for 13 fractions to a dose of 2600 cGy completing on 08/14/2017)  - On surveillance since then   3/6/2025: Heme Onc Assessment and plan:  1. Follicular lymphoma: Prior lymphadenopathy resolved. No obvious palpable   adenopathy today and no concerning symptoms. At this point he will remain on   surveillance and we will see him back again in 6 months.  From an oncology point of view okay to proceed with his mitral valve surgery.  Return to clinic in 6 months     VTE Prophylaxis: SCDs/TEDs, ambulation, SQ lovenox (will hold preop)  Code Status: Full Code    Dispo  - receives usual care through [facility]  - PT/OT to assess postop    JORDI Montesinos-CNP  Cardiac Surgery [facility]  [facility]  Team Phone [phone]

## 2025-03-14 NOTE — CARE PLAN
The patient's goals for the shift include      The clinical goals for the shift include Pt will have adequate rest for duration of shift      Problem: Pain - Adult  Goal: Verbalizes/displays adequate comfort level or baseline comfort level  Outcome: Progressing     Problem: Safety - Adult  Goal: Free from fall injury  Outcome: Progressing     Problem: Discharge Planning  Goal: Discharge to home or other facility with appropriate resources  Outcome: Progressing     Problem: Chronic Conditions and Co-morbidities  Goal: Patient's chronic conditions and co-morbidity symptoms are monitored and maintained or improved  Outcome: Progressing     Problem: Nutrition  Goal: Nutrient intake appropriate for maintaining nutritional needs  Outcome: Progressing

## 2025-03-14 NOTE — INTERVAL H&P NOTE
H&P reviewed. The patient was examined and there are no changes to the H&P.    Plan for [facility] today for pre- surgical planning. NO chest pain, 2+ radial pulse. All questions answered.

## 2025-03-15 LAB
ALBUMIN SERPL BCP-MCNC: 4 G/DL (ref 3.4–5)
ANION GAP SERPL CALC-SCNC: 13 MMOL/L (ref 10–20)
BUN SERPL-MCNC: 13 MG/DL (ref 6–23)
CALCIUM SERPL-MCNC: 8.9 MG/DL (ref 8.6–10.6)
CHLORIDE SERPL-SCNC: 107 MMOL/L (ref 98–107)
CO2 SERPL-SCNC: 25 MMOL/L (ref 21–32)
CREAT SERPL-MCNC: 0.86 MG/DL (ref 0.5–1.3)
EGFRCR SERPLBLD CKD-EPI 2021: >90 ML/MIN/1.73M*2
ERYTHROCYTE [DISTWIDTH] IN BLOOD BY AUTOMATED COUNT: 13.2 % (ref 11.5–14.5)
GLUCOSE SERPL-MCNC: 82 MG/DL (ref 74–99)
HCT VFR BLD AUTO: 41.5 % (ref 41–52)
HGB BLD-MCNC: 14.2 G/DL (ref 13.5–17.5)
MAGNESIUM SERPL-MCNC: 1.92 MG/DL (ref 1.6–2.4)
MCH RBC QN AUTO: 31.3 PG (ref 26–34)
MCHC RBC AUTO-ENTMCNC: 34.2 G/DL (ref 32–36)
MCV RBC AUTO: 91 FL (ref 80–100)
NRBC BLD-RTO: 0 /100 WBCS (ref 0–0)
PHOSPHATE SERPL-MCNC: 3.1 MG/DL (ref 2.5–4.9)
PLATELET # BLD AUTO: 146 X10*3/UL (ref 150–450)
POTASSIUM SERPL-SCNC: 3.7 MMOL/L (ref 3.5–5.3)
RBC # BLD AUTO: 4.54 X10*6/UL (ref 4.5–5.9)
SODIUM SERPL-SCNC: 141 MMOL/L (ref 136–145)
WBC # BLD AUTO: 3.4 X10*3/UL (ref 4.4–11.3)

## 2025-03-15 PROCEDURE — 85027 COMPLETE CBC AUTOMATED: CPT | Performed by: NURSE PRACTITIONER

## 2025-03-15 PROCEDURE — 83735 ASSAY OF MAGNESIUM: CPT | Performed by: NURSE PRACTITIONER

## 2025-03-15 PROCEDURE — 99232 SBSQ HOSP IP/OBS MODERATE 35: CPT | Performed by: NURSE PRACTITIONER

## 2025-03-15 PROCEDURE — 36415 COLL VENOUS BLD VENIPUNCTURE: CPT | Performed by: NURSE PRACTITIONER

## 2025-03-15 PROCEDURE — 80069 RENAL FUNCTION PANEL: CPT | Performed by: NURSE PRACTITIONER

## 2025-03-15 PROCEDURE — 2500000002 HC RX 250 W HCPCS SELF ADMINISTERED DRUGS (ALT 637 FOR MEDICARE OP, ALT 636 FOR OP/ED): Performed by: NURSE PRACTITIONER

## 2025-03-15 PROCEDURE — 2500000004 HC RX 250 GENERAL PHARMACY W/ HCPCS (ALT 636 FOR OP/ED): Performed by: NURSE PRACTITIONER

## 2025-03-15 PROCEDURE — 1200000002 HC GENERAL ROOM WITH TELEMETRY DAILY

## 2025-03-15 PROCEDURE — 2500000001 HC RX 250 WO HCPCS SELF ADMINISTERED DRUGS (ALT 637 FOR MEDICARE OP): Performed by: NURSE PRACTITIONER

## 2025-03-15 RX ORDER — POTASSIUM CHLORIDE 20 MEQ/1
40 TABLET, EXTENDED RELEASE ORAL ONCE
Status: COMPLETED | OUTPATIENT
Start: 2025-03-15 | End: 2025-03-15

## 2025-03-15 RX ADMIN — MUPIROCIN 0.5 APPLICATION: 20 OINTMENT TOPICAL at 21:33

## 2025-03-15 RX ADMIN — Medication 1000 UNITS: at 08:59

## 2025-03-15 RX ADMIN — POTASSIUM CHLORIDE 40 MEQ: 1500 TABLET, EXTENDED RELEASE ORAL at 09:00

## 2025-03-15 RX ADMIN — CYANOCOBALAMIN TAB 1000 MCG 1000 MCG: 1000 TAB at 08:59

## 2025-03-15 RX ADMIN — ENOXAPARIN SODIUM 40 MG: 100 INJECTION SUBCUTANEOUS at 09:00

## 2025-03-15 RX ADMIN — MUPIROCIN 0.5 APPLICATION: 20 OINTMENT TOPICAL at 09:00

## 2025-03-15 ASSESSMENT — COGNITIVE AND FUNCTIONAL STATUS - GENERAL
DAILY ACTIVITIY SCORE: 24
MOBILITY SCORE: 24

## 2025-03-15 ASSESSMENT — PAIN - FUNCTIONAL ASSESSMENT: PAIN_FUNCTIONAL_ASSESSMENT: 0-10

## 2025-03-15 ASSESSMENT — PAIN SCALES - GENERAL: PAINLEVEL_OUTOF10: 0 - NO PAIN

## 2025-03-15 NOTE — PROGRESS NOTES
"CARDIAC SURGERY DAILY PROGRESS NOTE    Rodney Mccabe is a 64 y.o. male, presenting with severe MR. Pt has a history of MVP, severe MR, non-obstructive CAD, treated Hep C, B-cell lymphoma 2004 and recurrence 2017, treated with radiation.   Dr Kimmie Ennis admitted patient 3/12/2025 in preparation for cardiac surgery. Plan is cardiac cath and then MV +/- CABG on 3/18.    Interval History:   No acute events  LHC yesterday ->>>Mild-moderate non-obstructive coronary atherosclerosis in a right dominant circulation.     SUBJECTIVE:  No complaints    Objective   /67 (BP Location: Left arm, Patient Position: Lying)   Pulse 63   Temp 36.3 °C (97.3 °F) (Temporal)   Resp 17   Ht 1.753 m (5' 9\")   Wt 67.2 kg (148 lb 3.2 oz)   SpO2 99%   BMI 21.89 kg/m²   0-10 (Numeric) Pain Score: 0 - No pain   3 Day Weight Change: Unable to Calculate    Intake and Output    Intake/Output Summary (Last 24 hours) at 3/15/2025 0721  Last data filed at 3/15/2025 0541  Gross per 24 hour   Intake --   Output 3302 ml   Net -3302 ml       Physical Exam  Physical Exam  Vitals and nursing note reviewed.   Constitutional:       Appearance: Normal appearance.   HENT:      Head: Normocephalic and atraumatic.      Mouth/Throat:      Mouth: Mucous membranes are moist.   Eyes:      Conjunctiva/sclera: Conjunctivae normal.   Cardiovascular:      Rate and Rhythm: Regular rhythm.      Pulses: Normal pulses.      Heart sounds: Murmur heard.      Comments: TELE - SB-SR 50s-60s, occ PVCs  Pulmonary:      Effort: Pulmonary effort is normal.      Breath sounds: Normal breath sounds.   Abdominal:      General: Abdomen is flat. Bowel sounds are normal.      Palpations: Abdomen is soft.   Genitourinary:     Comments: Voiding independently  Musculoskeletal:      Cervical back: Neck supple.      Right lower leg: No edema.      Left lower leg: No edema.   Skin:     General: Skin is warm and dry.      Capillary Refill: Capillary refill takes less than 2 seconds. "   Neurological:      General: No focal deficit present.      Mental Status: He is alert and oriented to person, place, and time.      Cranial Nerves: Facial asymmetry (baseline L facial droop s/p remote TBI) present.   Psychiatric:         Mood and Affect: Mood normal.         Behavior: Behavior normal.         Thought Content: Thought content normal.         Judgment: Judgment normal.       Medications  Scheduled medications  [START ON 3/17/2025] chlorhexidine, 15 mL, Mouth/Throat, BID  cholecalciferol, 1,000 Units, oral, Daily  cyanocobalamin, 1,000 mcg, oral, Daily  docusate sodium, 100 mg, oral, BID  enoxaparin, 40 mg, subcutaneous, q24h  mupirocin, 0.5 Application, Topical, BID    Continuous medications   PRN medications  PRN medications: acetaminophen, melatonin, oxygen, polyethylene glycol    Labs  No results found for this or any previous visit (from the past 24 hours).    IMAGING/ DIAGNOSTIC TESTING:  I have personally reviewed the following test result(s):      XR chest 2 views 03/12/2025  Impression  No acute cardiopulmonary process.    Transthoracic Echo (TTE) Complete 03/13/2025  CONCLUSIONS:  1. Left ventricular ejection fraction is normal, by visual estimate at 60-65%.  2. Left ventricular cavity size is moderately dilated.  3. There is normal right ventricular global systolic function.  4. The left atrial size is severely dilated.  5. Prolapse of the posterior MV leaflet is noted with a severe MR jet directed anteriorly and laterally. Total regurg volume by PISA as we well as by Volumetric method is > 50 ml and fraction is 50%.  6. Severe mitral valve regurgitation.  7. Right ventricular systolic pressure is within normal limits.    3/13/2025 Carotid Duplex  PRELIMINARY CONCLUSIONS:  Right Carotid: The right proximal internal carotid artery is normal. Right external carotid artery appears patent with no evidence of stenosis. The right vertebral artery is patent with antegrade flow. No evidence of  hemodynamically significant stenosis in the right subclavian artery.  Left Carotid: The left proximal internal carotid artery is normal. Left external carotid artery appears patent with no evidence of stenosis. The left vertebral artery is patent with antegrade flow. No evidence of hemodynamically significant stenosis in the left subclavian artery.    3/12/2025 Panorex  FINDINGS:  No fracture or dislocation is evident. The patient is missing teeth.  No definitive apical/periapical lucency is evident. Sinuses are clear.    CT chest wo IV contrast 03/13/2025  Impression  1. Bilateral axillary lymphadenopathy, which was described on prior  PET MR from 05/30/2024, and likely correlates with patient's history  of lymphoma. Please refer to prior outside imaging for direct  comparison.  2. Partially visualized cystic lesion within superior pole of the  left kidney. This lesion was previously characterized on prior PET  exam from 05/30/2024, and described as a complex cyst. Recommend  comparison with available outside imaging and consider non-emergent  renal protocol CT or MRI for further evaluation.  3. Mild scattered calcific atherosclerotic disease of the thoracic  aorta and coronary arteries.  4. Chronic appearing compression fracture of the T8 vertebrae without  retropulsion.         ===============  IMPRESSION & PLAN:  ===============  Preop mitral valve surgery +/- 3/18 with Dr Kimmie Ennis  - activity and pulm toilet  - Optimize nutrition and electrolytes  - 2v CXR 3/13  - TTE 3/13 Left ventricular ejection fraction is normal, by visual estimate at 60-65%.  Prolapse of the posterior MV leaflet is noted with a severe MR jet directed anteriorly and laterally. Severe MVR  - CT chest 3/14 ->1. Bilateral axillary lymphadenopathy, which was described on prior  PET MR from 05/30/2024, and likely correlates with patient's history of lymphoma. Please refer to prior outside imaging for direct comparison. Partially visualized  cystic lesion within superior pole of the left kidney. This lesion was previously characterized on prior PET exam from 05/30/2024, and described as a complex cyst. Recommend  comparison with available outside imaging and consider non-emergent renal protocol CT or MRI for further evaluation. Mild scattered calcific atherosclerotic disease of the thoracic aorta and coronary arteries. Chronic appearing compression fracture of the T8 vertebrae without retropulsion.  - [facility] 3/14 Left heart cath: LVEDP 12. Mild-moderate non-obstructive coronary atherosclerosis in a right dominant circulation.   - panorex 3/12; carotids, curtis/ABG 3/13  - MRSA pending; UA negative  - No ACEi/ARBs in the pre-op period (at least 48 hours)  - Hold any SGLT2 inhibitors (Farxiga, Jardiance, etc.) for at least 3 days prior to cardiac surgery to prevent euglycemic DKA  - Hold any daily GLP-1 agonist drugs on the day of surgery. Hold any weekly GLP-1 drugs for 7 days prior to surgery. (Dulaglutide, Exenatide, Liraglutide, Lixisenatide, & Semaglutide)  - No antiplatelets other than ASA, no anticoagulants other than Heparin  - NPO after midnight, blood on hold/T&S, and preop scrubs ordered    Rhythm  - Tele: SR  - Tele until discharge    Volume/Electrolyte Status: Preop wt Weight: 68.6 kg (151 lb 3.2 oz)   Vitals:    03/15/25 0541   Weight: 67.2 kg (148 lb 3.2 oz)   - Replete electrolytes for hypokalemia/hypomagnesemia/hypophosphatemia as needed - 3/13 replaced K  - Daily weights and I&Os    Nutrition  - 3/13 Vit D and B-12 WNL  - continue home Vit D  - continue home B-12     H/o treated Hep C  - AB +, reflex PCR in progress     H/o B-cell lymphoma   Follicular lymphoma   - s/p 4 cycles of R-CHOP 2006   - recurrence of low grade follicular lymphoma at left gingiva s/p Radiation 2008   left mandible to a dose of 2700 cGy in 15 fractions of 180 cGy on 9/2008  -Recurrence in neck status post radiation therapy to the right neck 200 cGy day   for 13  fractions to a dose of 2600 cGy completing on 08/14/2017)  - On surveillance since then   3/6/2025: Heme Onc Assessment and plan:  1. Follicular lymphoma: Prior lymphadenopathy resolved. No obvious palpable   adenopathy today and no concerning symptoms. At this point he will remain on   surveillance and we will see him back again in 6 months. From an oncology point of view okay to proceed with his mitral valve surgery. Return to clinic in 6 months     VTE Prophylaxis: SCDs/TEDs, ambulation, SQ lovenox (will hold preop)  Code Status: Full Code    Dispo  - receives usual care through [facility]  - PT/OT to assess postop    JORDI Ace-CNP  Cardiac Surgery [facility]  [facility]  Team Phone [phone]

## 2025-03-15 NOTE — NURSING NOTE
TR band reinflated x2 by this RN d/t scant oozing from R radial site. Dr. Blaire Browne made aware. Continue following current policy on TR band removal. TR band successfully removed approximately 2200 without any oozing and applied 2x2 and tegaderm. Color, temperature, pulses, capillary refill, and sensation all appropriate and WNL, no hematoma visualized. Reinforced wrist precautions with patient, verbalizes understanding.

## 2025-03-16 VITALS
BODY MASS INDEX: 22.22 KG/M2 | SYSTOLIC BLOOD PRESSURE: 123 MMHG | OXYGEN SATURATION: 98 % | HEIGHT: 69 IN | DIASTOLIC BLOOD PRESSURE: 76 MMHG | HEART RATE: 63 BPM | TEMPERATURE: 97.5 F | RESPIRATION RATE: 17 BRPM | WEIGHT: 150 LBS

## 2025-03-16 LAB
ABO GROUP (TYPE) IN BLOOD: NORMAL
ALBUMIN SERPL BCP-MCNC: 4.1 G/DL (ref 3.4–5)
ANION GAP SERPL CALC-SCNC: 9 MMOL/L (ref 10–20)
ANTIBODY SCREEN: NORMAL
BUN SERPL-MCNC: 9 MG/DL (ref 6–23)
CALCIUM SERPL-MCNC: 9 MG/DL (ref 8.6–10.6)
CHLORIDE SERPL-SCNC: 108 MMOL/L (ref 98–107)
CO2 SERPL-SCNC: 28 MMOL/L (ref 21–32)
CREAT SERPL-MCNC: 0.96 MG/DL (ref 0.5–1.3)
EGFRCR SERPLBLD CKD-EPI 2021: 88 ML/MIN/1.73M*2
ERYTHROCYTE [DISTWIDTH] IN BLOOD BY AUTOMATED COUNT: 13.1 % (ref 11.5–14.5)
GLUCOSE SERPL-MCNC: 85 MG/DL (ref 74–99)
HCT VFR BLD AUTO: 43.8 % (ref 41–52)
HGB BLD-MCNC: 14.8 G/DL (ref 13.5–17.5)
MAGNESIUM SERPL-MCNC: 2.05 MG/DL (ref 1.6–2.4)
MCH RBC QN AUTO: 31.6 PG (ref 26–34)
MCHC RBC AUTO-ENTMCNC: 33.8 G/DL (ref 32–36)
MCV RBC AUTO: 94 FL (ref 80–100)
NRBC BLD-RTO: 0 /100 WBCS (ref 0–0)
PHOSPHATE SERPL-MCNC: 2.5 MG/DL (ref 2.5–4.9)
PLATELET # BLD AUTO: 143 X10*3/UL (ref 150–450)
POTASSIUM SERPL-SCNC: 4.4 MMOL/L (ref 3.5–5.3)
RBC # BLD AUTO: 4.68 X10*6/UL (ref 4.5–5.9)
RH FACTOR (ANTIGEN D): NORMAL
SODIUM SERPL-SCNC: 141 MMOL/L (ref 136–145)
WBC # BLD AUTO: 3.7 X10*3/UL (ref 4.4–11.3)

## 2025-03-16 PROCEDURE — 1200000002 HC GENERAL ROOM WITH TELEMETRY DAILY

## 2025-03-16 PROCEDURE — 83735 ASSAY OF MAGNESIUM: CPT | Performed by: NURSE PRACTITIONER

## 2025-03-16 PROCEDURE — 85027 COMPLETE CBC AUTOMATED: CPT | Performed by: NURSE PRACTITIONER

## 2025-03-16 PROCEDURE — 86923 COMPATIBILITY TEST ELECTRIC: CPT

## 2025-03-16 PROCEDURE — 2500000004 HC RX 250 GENERAL PHARMACY W/ HCPCS (ALT 636 FOR OP/ED): Performed by: NURSE PRACTITIONER

## 2025-03-16 PROCEDURE — 2500000001 HC RX 250 WO HCPCS SELF ADMINISTERED DRUGS (ALT 637 FOR MEDICARE OP): Performed by: NURSE PRACTITIONER

## 2025-03-16 PROCEDURE — 99232 SBSQ HOSP IP/OBS MODERATE 35: CPT | Performed by: NURSE PRACTITIONER

## 2025-03-16 PROCEDURE — 80069 RENAL FUNCTION PANEL: CPT | Performed by: NURSE PRACTITIONER

## 2025-03-16 PROCEDURE — 36415 COLL VENOUS BLD VENIPUNCTURE: CPT | Performed by: NURSE PRACTITIONER

## 2025-03-16 PROCEDURE — 86901 BLOOD TYPING SEROLOGIC RH(D): CPT | Performed by: NURSE PRACTITIONER

## 2025-03-16 RX ADMIN — MUPIROCIN 0.5 APPLICATION: 20 OINTMENT TOPICAL at 09:00

## 2025-03-16 RX ADMIN — CYANOCOBALAMIN TAB 1000 MCG 1000 MCG: 1000 TAB at 09:01

## 2025-03-16 RX ADMIN — Medication 1000 UNITS: at 09:01

## 2025-03-16 RX ADMIN — MUPIROCIN 0.5 APPLICATION: 20 OINTMENT TOPICAL at 21:07

## 2025-03-16 RX ADMIN — ENOXAPARIN SODIUM 40 MG: 100 INJECTION SUBCUTANEOUS at 09:01

## 2025-03-16 ASSESSMENT — COGNITIVE AND FUNCTIONAL STATUS - GENERAL
DAILY ACTIVITIY SCORE: 24
DAILY ACTIVITIY SCORE: 24
MOBILITY SCORE: 24
MOBILITY SCORE: 24

## 2025-03-16 ASSESSMENT — PAIN SCALES - GENERAL
PAINLEVEL_OUTOF10: 0 - NO PAIN
PAINLEVEL_OUTOF10: 0 - NO PAIN

## 2025-03-16 ASSESSMENT — PAIN - FUNCTIONAL ASSESSMENT: PAIN_FUNCTIONAL_ASSESSMENT: 0-10

## 2025-03-16 NOTE — PROGRESS NOTES
"CARDIAC SURGERY DAILY PROGRESS NOTE    Rodney Mccabe is a 64 y.o. male, presenting with severe MR. Pt has a history of MVP, severe MR, non-obstructive CAD, treated Hep C, B-cell lymphoma 2004 and recurrence 2017, treated with radiation.   Dr Kimmie Ennis admitted patient 3/12/2025 in preparation for cardiac surgery. Plan is cardiac cath and then MV +/- CABG on 3/18.    Interval History:   No acute events  [facility] 3/14 ->>>Mild-moderate non-obstructive coronary atherosclerosis in a right dominant circulation.     SUBJECTIVE:  No complaints    Objective   /63   Pulse 71   Temp 36.2 °C (97.2 °F)   Resp 19   Ht 1.753 m (5' 9\")   Wt 68 kg (150 lb)   SpO2 97%   BMI 22.15 kg/m²   0-10 (Numeric) Pain Score: 0 - No pain   3 Day Weight Change: -0.181 kg (-6.4 oz) per day    Intake and Output    Intake/Output Summary (Last 24 hours) at 3/16/2025 1331  Last data filed at 3/16/2025 0956  Gross per 24 hour   Intake 360 ml   Output 3030 ml   Net -2670 ml       Physical Exam  Physical Exam  Vitals and nursing note reviewed.   Constitutional:       Appearance: Normal appearance.   HENT:      Head: Normocephalic and atraumatic.      Mouth/Throat:      Mouth: Mucous membranes are moist.   Eyes:      Conjunctiva/sclera: Conjunctivae normal.   Cardiovascular:      Rate and Rhythm: Regular rhythm.      Pulses: Normal pulses.      Heart sounds: Murmur heard.      Comments: TELE - SB-SR 50s-60s, occ PVCs  Pulmonary:      Effort: Pulmonary effort is normal.      Breath sounds: Normal breath sounds.   Abdominal:      General: Abdomen is flat. Bowel sounds are normal.      Palpations: Abdomen is soft.   Genitourinary:     Comments: Voiding independently  Musculoskeletal:      Cervical back: Neck supple.      Right lower leg: No edema.      Left lower leg: No edema.   Skin:     General: Skin is warm and dry.      Capillary Refill: Capillary refill takes less than 2 seconds.   Neurological:      General: No focal deficit present.      " Mental Status: He is alert and oriented to person, place, and time.      Cranial Nerves: Facial asymmetry (baseline L facial droop s/p remote TBI) present.   Psychiatric:         Mood and Affect: Mood normal.         Behavior: Behavior normal.         Thought Content: Thought content normal.         Judgment: Judgment normal.       Medications  Scheduled medications  [START ON 3/17/2025] chlorhexidine, 15 mL, Mouth/Throat, BID  cholecalciferol, 1,000 Units, oral, Daily  cyanocobalamin, 1,000 mcg, oral, Daily  docusate sodium, 100 mg, oral, BID  mupirocin, 0.5 Application, Topical, BID    Continuous medications   PRN medications  PRN medications: acetaminophen, melatonin, oxygen, polyethylene glycol    Labs  Results for orders placed or performed during the hospital encounter of 03/12/25 (from the past 24 hours)   Type And Screen   Result Value Ref Range    ABO TYPE O     Rh TYPE POS     ANTIBODY SCREEN NEG    CBC   Result Value Ref Range    WBC 3.7 (L) 4.4 - 11.3 x10*3/uL    nRBC 0.0 0.0 - 0.0 /100 WBCs    RBC 4.68 4.50 - 5.90 x10*6/uL    Hemoglobin 14.8 13.5 - 17.5 g/dL    Hematocrit 43.8 41.0 - 52.0 %    MCV 94 80 - 100 fL    MCH 31.6 26.0 - 34.0 pg    MCHC 33.8 32.0 - 36.0 g/dL    RDW 13.1 11.5 - 14.5 %    Platelets 143 (L) 150 - 450 x10*3/uL   Renal Function Panel   Result Value Ref Range    Glucose 85 74 - 99 mg/dL    Sodium 141 136 - 145 mmol/L    Potassium 4.4 3.5 - 5.3 mmol/L    Chloride 108 (H) 98 - 107 mmol/L    Bicarbonate 28 21 - 32 mmol/L    Anion Gap 9 (L) 10 - 20 mmol/L    Urea Nitrogen 9 6 - 23 mg/dL    Creatinine 0.96 0.50 - 1.30 mg/dL    eGFR 88 >60 mL/min/1.73m*2    Calcium 9.0 8.6 - 10.6 mg/dL    Phosphorus 2.5 2.5 - 4.9 mg/dL    Albumin 4.1 3.4 - 5.0 g/dL   Magnesium   Result Value Ref Range    Magnesium 2.05 1.60 - 2.40 mg/dL       IMAGING/ DIAGNOSTIC TESTING:  I have personally reviewed the following test result(s):      XR chest 2 views 03/12/2025  Impression  No acute cardiopulmonary  process.    Transthoracic Echo (TTE) Complete 03/13/2025  CONCLUSIONS:  1. Left ventricular ejection fraction is normal, by visual estimate at 60-65%.  2. Left ventricular cavity size is moderately dilated.  3. There is normal right ventricular global systolic function.  4. The left atrial size is severely dilated.  5. Prolapse of the posterior MV leaflet is noted with a severe MR jet directed anteriorly and laterally. Total regurg volume by PISA as we well as by Volumetric method is > 50 ml and fraction is 50%.  6. Severe mitral valve regurgitation.  7. Right ventricular systolic pressure is within normal limits.    3/13/2025 Carotid Duplex  PRELIMINARY CONCLUSIONS:  Right Carotid: The right proximal internal carotid artery is normal. Right external carotid artery appears patent with no evidence of stenosis. The right vertebral artery is patent with antegrade flow. No evidence of hemodynamically significant stenosis in the right subclavian artery.  Left Carotid: The left proximal internal carotid artery is normal. Left external carotid artery appears patent with no evidence of stenosis. The left vertebral artery is patent with antegrade flow. No evidence of hemodynamically significant stenosis in the left subclavian artery.    3/12/2025 Panorex  FINDINGS:  No fracture or dislocation is evident. The patient is missing teeth.  No definitive apical/periapical lucency is evident. Sinuses are clear.    CT chest wo IV contrast 03/13/2025  Impression  1. Bilateral axillary lymphadenopathy, which was described on prior  PET MR from 05/30/2024, and likely correlates with patient's history  of lymphoma. Please refer to prior outside imaging for direct  comparison.  2. Partially visualized cystic lesion within superior pole of the  left kidney. This lesion was previously characterized on prior PET  exam from 05/30/2024, and described as a complex cyst. Recommend  comparison with available outside imaging and consider  non-emergent  renal protocol CT or MRI for further evaluation.  3. Mild scattered calcific atherosclerotic disease of the thoracic  aorta and coronary arteries.  4. Chronic appearing compression fracture of the T8 vertebrae without  retropulsion.         ===============  IMPRESSION & PLAN:  ===============  Preop mitral valve surgery +/- 3/18 with Dr Kimmie Ennis  - activity and pulm toilet  - Optimize nutrition and electrolytes  - 2v CXR 3/13  - TTE 3/13 Left ventricular ejection fraction is normal, by visual estimate at 60-65%.  Prolapse of the posterior MV leaflet is noted with a severe MR jet directed anteriorly and laterally. Severe MVR  - CT chest 3/14 ->1. Bilateral axillary lymphadenopathy, which was described on prior  PET MR from 05/30/2024, and likely correlates with patient's history of lymphoma. Please refer to prior outside imaging for direct comparison. Partially visualized cystic lesion within superior pole of the left kidney. This lesion was previously characterized on prior PET exam from 05/30/2024, and described as a complex cyst. Recommend  comparison with available outside imaging and consider non-emergent renal protocol CT or MRI for further evaluation. Mild scattered calcific atherosclerotic disease of the thoracic aorta and coronary arteries. Chronic appearing compression fracture of the T8 vertebrae without retropulsion.  - [facility] 3/14 Left heart cath: LVEDP 12. Mild-moderate non-obstructive coronary atherosclerosis in a right dominant circulation.   - panorex 3/12; carotids, curtis/ABG 3/13  - MRSA pending; UA negative  - No ACEi/ARBs in the pre-op period (at least 48 hours)  - Hold any SGLT2 inhibitors (Farxiga, Jardiance, etc.) for at least 3 days prior to cardiac surgery to prevent euglycemic DKA  - Hold any daily GLP-1 agonist drugs on the day of surgery. Hold any weekly GLP-1 drugs for 7 days prior to surgery. (Dulaglutide, Exenatide, Liraglutide, Lixisenatide, & Semaglutide)  - No  antiplatelets other than ASA, no anticoagulants other than Heparin  - NPO after midnight, blood on hold/T&S, and preop scrubs ordered    Rhythm  - Tele: SR  - Tele until discharge    Volume/Electrolyte Status: Preop wt Weight: 68.6 kg (151 lb 3.2 oz)   Vitals:    03/16/25 0543   Weight: 68 kg (150 lb)   - Replete electrolytes for hypokalemia/hypomagnesemia/hypophosphatemia as needed - 3/13 replaced K  - Daily weights and I&Os    Nutrition  - 3/13 Vit D and B-12 WNL  - continue home Vit D  - continue home B-12     H/o treated Hep C  - AB +, reflex PCR in progress  - HCV RNA result: not detected     H/o B-cell lymphoma   Follicular lymphoma   - s/p 4 cycles of R-CHOP 2006   - 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 Follicular lymphoma: Prior lymphadenopathy resolved. No obvious palpable   adenopathy today and no concerning symptoms. At this point he will remain on   surveillance and we will see him back again in 6 months. From an oncology point of view okay to proceed with his mitral valve surgery. Return to clinic in 6 months     VTE Prophylaxis: SCDs/TEDs, ambulation, SQ lovenox (will hold preop)  Code Status: Full Code    Dispo  - receives usual care through [facility]  - PT/OT to assess postop    JORDI Montesinos-CNP  Cardiac Surgery [facility]  [facility]  Team Phone [phone]

## 2025-03-16 NOTE — PROGRESS NOTES
03/16/25 1511   Discharge Planning   Living Arrangements Spouse/significant other   Support Systems Spouse/significant other   Type of Residence Private residence   Home or Post Acute Services In home services   Type of Home Care Services Home nursing visits   Expected Discharge Disposition [facility]  ([facility]   Does the patient need discharge transport arranged? No     Patient pre-op cardiac surgery.  Patient is [facility] .  Clinicals will need to be submitted to the [facility] Transitional Care Team.

## 2025-03-17 ENCOUNTER — ANESTHESIA EVENT (OUTPATIENT)
Dept: OPERATING ROOM | Facility: HOSPITAL | Age: 65
End: 2025-03-17
Payer: OTHER GOVERNMENT

## 2025-03-17 ENCOUNTER — APPOINTMENT (OUTPATIENT)
Dept: RADIOLOGY | Facility: HOSPITAL | Age: 65
DRG: 216 | End: 2025-03-17
Payer: OTHER GOVERNMENT

## 2025-03-17 LAB
ALBUMIN SERPL BCP-MCNC: 3.9 G/DL (ref 3.4–5)
ANION GAP SERPL CALC-SCNC: 11 MMOL/L (ref 10–20)
BUN SERPL-MCNC: 10 MG/DL (ref 6–23)
CALCIUM SERPL-MCNC: 8.8 MG/DL (ref 8.6–10.6)
CHLORIDE SERPL-SCNC: 107 MMOL/L (ref 98–107)
CO2 SERPL-SCNC: 26 MMOL/L (ref 21–32)
CREAT SERPL-MCNC: 0.86 MG/DL (ref 0.5–1.3)
EGFRCR SERPLBLD CKD-EPI 2021: >90 ML/MIN/1.73M*2
ERYTHROCYTE [DISTWIDTH] IN BLOOD BY AUTOMATED COUNT: 12.9 % (ref 11.5–14.5)
GLUCOSE SERPL-MCNC: 84 MG/DL (ref 74–99)
HCT VFR BLD AUTO: 41.4 % (ref 41–52)
HGB BLD-MCNC: 14.1 G/DL (ref 13.5–17.5)
MAGNESIUM SERPL-MCNC: 2.05 MG/DL (ref 1.6–2.4)
MCH RBC QN AUTO: 31.8 PG (ref 26–34)
MCHC RBC AUTO-ENTMCNC: 34.1 G/DL (ref 32–36)
MCV RBC AUTO: 94 FL (ref 80–100)
NRBC BLD-RTO: 0 /100 WBCS (ref 0–0)
PHOSPHATE SERPL-MCNC: 2.9 MG/DL (ref 2.5–4.9)
PLATELET # BLD AUTO: 139 X10*3/UL (ref 150–450)
POTASSIUM SERPL-SCNC: 3.7 MMOL/L (ref 3.5–5.3)
RBC # BLD AUTO: 4.43 X10*6/UL (ref 4.5–5.9)
SODIUM SERPL-SCNC: 140 MMOL/L (ref 136–145)
WBC # BLD AUTO: 3.8 X10*3/UL (ref 4.4–11.3)

## 2025-03-17 PROCEDURE — 85027 COMPLETE CBC AUTOMATED: CPT | Performed by: NURSE PRACTITIONER

## 2025-03-17 PROCEDURE — 2500000001 HC RX 250 WO HCPCS SELF ADMINISTERED DRUGS (ALT 637 FOR MEDICARE OP): Performed by: NURSE PRACTITIONER

## 2025-03-17 PROCEDURE — 74176 CT ABD & PELVIS W/O CONTRAST: CPT

## 2025-03-17 PROCEDURE — 1200000002 HC GENERAL ROOM WITH TELEMETRY DAILY

## 2025-03-17 PROCEDURE — 80069 RENAL FUNCTION PANEL: CPT | Performed by: NURSE PRACTITIONER

## 2025-03-17 PROCEDURE — 83735 ASSAY OF MAGNESIUM: CPT | Performed by: NURSE PRACTITIONER

## 2025-03-17 PROCEDURE — 99232 SBSQ HOSP IP/OBS MODERATE 35: CPT

## 2025-03-17 PROCEDURE — 2500000002 HC RX 250 W HCPCS SELF ADMINISTERED DRUGS (ALT 637 FOR MEDICARE OP, ALT 636 FOR OP/ED)

## 2025-03-17 PROCEDURE — 36415 COLL VENOUS BLD VENIPUNCTURE: CPT | Performed by: NURSE PRACTITIONER

## 2025-03-17 RX ORDER — POTASSIUM CHLORIDE 20 MEQ/1
40 TABLET, EXTENDED RELEASE ORAL ONCE
Status: COMPLETED | OUTPATIENT
Start: 2025-03-17 | End: 2025-03-17

## 2025-03-17 RX ADMIN — MUPIROCIN 0.5 APPLICATION: 20 OINTMENT TOPICAL at 08:15

## 2025-03-17 RX ADMIN — POTASSIUM CHLORIDE 40 MEQ: 1500 TABLET, EXTENDED RELEASE ORAL at 10:32

## 2025-03-17 RX ADMIN — CHLORHEXIDINE GLUCONATE 0.12% ORAL RINSE 15 ML: 1.2 LIQUID ORAL at 21:06

## 2025-03-17 RX ADMIN — Medication 1000 UNITS: at 08:16

## 2025-03-17 RX ADMIN — CYANOCOBALAMIN TAB 1000 MCG 1000 MCG: 1000 TAB at 08:16

## 2025-03-17 ASSESSMENT — PAIN SCALES - GENERAL
PAINLEVEL_OUTOF10: 0 - NO PAIN

## 2025-03-17 ASSESSMENT — COGNITIVE AND FUNCTIONAL STATUS - GENERAL
DAILY ACTIVITIY SCORE: 24
DAILY ACTIVITIY SCORE: 24
MOBILITY SCORE: 24
MOBILITY SCORE: 24

## 2025-03-17 ASSESSMENT — PAIN - FUNCTIONAL ASSESSMENT
PAIN_FUNCTIONAL_ASSESSMENT: 0-10

## 2025-03-17 NOTE — CARE PLAN
delirium protocol.    The patient's goals for the shift include      Problem: Pain - Adult  Goal: Verbalizes/displays adequate comfort level or baseline comfort level  Outcome: Progressing     Problem: Safety - Adult  Goal: Free from fall injury  Outcome: Progressing     Problem: Discharge Planning  Goal: Discharge to home or other facility with appropriate resources  Outcome: Progressing     Problem: Chronic Conditions and Co-morbidities  Goal: Patient's chronic conditions and co-morbidity symptoms are monitored and maintained or improved  Outcome: Progressing       The clinical goals for the shift include Pt will remain hemodynamically stable this shift

## 2025-03-17 NOTE — PROGRESS NOTES
"CARDIAC SURGERY DAILY PROGRESS NOTE    Rodney Mccabe is a 64 y.o. male, presenting with severe MR. Pt has a history of MVP, severe MR, non-obstructive CAD, treated Hep C, B-cell lymphoma 2004 and recurrence 2017, treated with radiation.   Dr Kimmie Ennis admitted patient 3/12/2025 in preparation for cardiac surgery. Plan is cardiac cath and then MV +/- CABG on 3/18.    Interval History:   No acute events  [facility] 3/14 ->>>Mild-moderate non-obstructive coronary atherosclerosis in a right dominant circulation.     SUBJECTIVE:  Doing well, no concerns, ready for surgery    Objective   /70 (BP Location: Right arm, Patient Position: Lying)   Pulse 63   Temp 36.5 °C (97.7 °F) (Temporal)   Resp 17   Ht 1.753 m (5' 9\")   Wt 67.9 kg (149 lb 9.6 oz)   SpO2 98%   BMI 22.09 kg/m²   0-10 (Numeric) Pain Score: 0 - No pain   3 Day Weight Change: Unable to Calculate    Intake and Output    Intake/Output Summary (Last 24 hours) at 3/17/2025 1230  Last data filed at 3/17/2025 1033  Gross per 24 hour   Intake 1440 ml   Output 2100 ml   Net -660 ml       Physical Exam  Physical Exam  Vitals and nursing note reviewed.   Constitutional:       Appearance: Normal appearance.   HENT:      Head: Normocephalic and atraumatic.      Mouth/Throat:      Mouth: Mucous membranes are moist.   Eyes:      Conjunctiva/sclera: Conjunctivae normal.   Cardiovascular:      Rate and Rhythm: Regular rhythm.      Pulses: Normal pulses.      Heart sounds: Murmur heard.      Comments: TELE - SB-SR 50s-60s, occ PVCs  Pulmonary:      Effort: Pulmonary effort is normal.      Breath sounds: Normal breath sounds.   Abdominal:      General: Abdomen is flat. Bowel sounds are normal.      Palpations: Abdomen is soft.   Genitourinary:     Comments: Voiding independently  Musculoskeletal:      Cervical back: Neck supple.      Right lower leg: No edema.      Left lower leg: No edema.   Skin:     General: Skin is warm and dry.      Capillary Refill: Capillary refill " takes less than 2 seconds.   Neurological:      General: No focal deficit present.      Mental Status: He is alert and oriented to person, place, and time.      Cranial Nerves: Facial asymmetry (baseline L facial droop s/p remote TBI) present.   Psychiatric:         Mood and Affect: Mood normal.         Behavior: Behavior normal.         Thought Content: Thought content normal.         Judgment: Judgment normal.       Medications  Scheduled medications  chlorhexidine, 15 mL, Mouth/Throat, BID  cholecalciferol, 1,000 Units, oral, Daily  cyanocobalamin, 1,000 mcg, oral, Daily  docusate sodium, 100 mg, oral, BID    Continuous medications   PRN medications  PRN medications: acetaminophen, melatonin, oxygen, polyethylene glycol    Labs  Results for orders placed or performed during the hospital encounter of 03/12/25 (from the past 24 hours)   CBC   Result Value Ref Range    WBC 3.8 (L) 4.4 - 11.3 x10*3/uL    nRBC 0.0 0.0 - 0.0 /100 WBCs    RBC 4.43 (L) 4.50 - 5.90 x10*6/uL    Hemoglobin 14.1 13.5 - 17.5 g/dL    Hematocrit 41.4 41.0 - 52.0 %    MCV 94 80 - 100 fL    MCH 31.8 26.0 - 34.0 pg    MCHC 34.1 32.0 - 36.0 g/dL    RDW 12.9 11.5 - 14.5 %    Platelets 139 (L) 150 - 450 x10*3/uL   Renal Function Panel   Result Value Ref Range    Glucose 84 74 - 99 mg/dL    Sodium 140 136 - 145 mmol/L    Potassium 3.7 3.5 - 5.3 mmol/L    Chloride 107 98 - 107 mmol/L    Bicarbonate 26 21 - 32 mmol/L    Anion Gap 11 10 - 20 mmol/L    Urea Nitrogen 10 6 - 23 mg/dL    Creatinine 0.86 0.50 - 1.30 mg/dL    eGFR >90 >60 mL/min/1.73m*2    Calcium 8.8 8.6 - 10.6 mg/dL    Phosphorus 2.9 2.5 - 4.9 mg/dL    Albumin 3.9 3.4 - 5.0 g/dL   Magnesium   Result Value Ref Range    Magnesium 2.05 1.60 - 2.40 mg/dL       IMAGING/ DIAGNOSTIC TESTING:  I have personally reviewed the following test result(s):      CT abdomen pelvis wo IV contrast 03/17/2025  MPRESSION:  1.  No evidence of acute abdominopelvic abnormality.  2.  Moderate atherosclerotic  calcifications involving the abdominal  aorta and its branches.  3.  Re-demonstration of cystic lesion with a small foci of  calcification in the upper pole of the left kidney which was  previously characterized on prior PET exam from 05/30/2024, and  described as a complex cyst. Recommend renal MRI for further  evaluation.  4. Additional incidental findings as detailed above    XR chest 2 views 03/12/2025  Impression  No acute cardiopulmonary process.    Transthoracic Echo (TTE) Complete 03/13/2025  CONCLUSIONS:  1. Left ventricular ejection fraction is normal, by visual estimate at 60-65%.  2. Left ventricular cavity size is moderately dilated.  3. There is normal right ventricular global systolic function.  4. The left atrial size is severely dilated.  5. Prolapse of the posterior MV leaflet is noted with a severe MR jet directed anteriorly and laterally. Total regurg volume by PISA as we well as by Volumetric method is > 50 ml and fraction is 50%.  6. Severe mitral valve regurgitation.  7. Right ventricular systolic pressure is within normal limits.    3/13/2025 Carotid Duplex  PRELIMINARY CONCLUSIONS:  Right Carotid: The right proximal internal carotid artery is normal. Right external carotid artery appears patent with no evidence of stenosis. The right vertebral artery is patent with antegrade flow. No evidence of hemodynamically significant stenosis in the right subclavian artery.  Left Carotid: The left proximal internal carotid artery is normal. Left external carotid artery appears patent with no evidence of stenosis. The left vertebral artery is patent with antegrade flow. No evidence of hemodynamically significant stenosis in the left subclavian artery.    3/12/2025 Panorex  FINDINGS:  No fracture or dislocation is evident. The patient is missing teeth.  No definitive apical/periapical lucency is evident. Sinuses are clear.    CT chest wo IV contrast 03/13/2025  Impression  1. Bilateral axillary  lymphadenopathy, which was described on prior  PET MR from 05/30/2024, and likely correlates with patient's history  of lymphoma. Please refer to prior outside imaging for direct  comparison.  2. Partially visualized cystic lesion within superior pole of the  left kidney. This lesion was previously characterized on prior PET  exam from 05/30/2024, and described as a complex cyst. Recommend  comparison with available outside imaging and consider non-emergent  renal protocol CT or MRI for further evaluation.  3. Mild scattered calcific atherosclerotic disease of the thoracic  aorta and coronary arteries./  4. Chronic appearing compression fracture of the T8 vertebrae without  retropulsion.       ===============  IMPRESSION & PLAN:  ===============  Preop mitral valve repair 3/18 with Dr Kimmie Ennis  - activity and pulm toilet  - Optimize nutrition and electrolytes  - 2v CXR 3/13  - TTE 3/13 Left ventricular ejection fraction is normal, by visual estimate at 60-65%.  Prolapse of the posterior MV leaflet is noted with a severe MR jet directed anteriorly and laterally. Severe MVR  - CT chest 3/14 ->1. Bilateral axillary lymphadenopathy, which was described on prior  PET MR from 05/30/2024, and likely correlates with patient's history of lymphoma. Please refer to prior outside imaging for direct comparison. Partially visualized cystic lesion within superior pole of the left kidney. This lesion was previously characterized on prior PET exam from 05/30/2024, and described as a complex cyst. Recommend  comparison with available outside imaging and consider non-emergent renal protocol CT or MRI for further evaluation. Mild scattered calcific atherosclerotic disease of the thoracic aorta and coronary arteries. Chronic appearing compression fracture of the T8 vertebrae without retropulsion.  - [facility] 3/14 Left heart cath: LVEDP 12. Mild-moderate non-obstructive coronary atherosclerosis in a right dominant circulation.  - CT  abdomen/pelvis 3/17 per Dr. Ennis   - panorex 3/12; carotids, curtis/ABG 3/13  - MRSA negative; UA negative  - No ACEi/ARBs in the pre-op period (at least 48 hours)  - Hold any SGLT2 inhibitors (Farxiga, Jardiance, etc.) for at least 3 days prior to cardiac surgery to prevent euglycemic DKA  - Hold any daily GLP-1 agonist drugs on the day of surgery. Hold any weekly GLP-1 drugs for 7 days prior to surgery. (Dulaglutide, Exenatide, Liraglutide, Lixisenatide, & Semaglutide)  - No antiplatelets other than ASA, no anticoagulants other than Heparin  - NPO after midnight, blood on hold/T&S, and preop scrubs ordered    Rhythm  - Tele: SR-SB 50s-60s  - Tele until discharge    Volume/Electrolyte Status: Preop wt Weight: 68.6 kg (151 lb 3.2 oz)   Vitals:    03/17/25 0546   Weight: 67.9 kg (149 lb 9.6 oz)   - Replete electrolytes for hypokalemia/hypomagnesemia/hypophosphatemia as needed - 3/17 replaced K  - Daily weights and I&Os    Nutrition  - 3/13 Vit D and B-12 WNL  - continue home Vit D  - continue home B-12     H/o treated Hep C  - AB +, reflex PCR not detected  - HCV RNA result: not detected     H/o B-cell lymphoma   Follicular lymphoma   - s/p 4 cycles of R-CHOP 2006   - 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 Follicular lymphoma: Prior lymphadenopathy resolved. No obvious palpable   adenopathy today and no concerning symptoms. At this point he will remain on   surveillance and we will see him back again in 6 months. From an oncology point of view okay to proceed with his mitral valve surgery. Return to clinic in 6 months     VTE Prophylaxis: SCDs/TEDs, ambulation, SQ lovenox (will hold  preop)  Code Status: Full Code    Dispo  - receives usual care through [facility]  - PT/OT to assess postop    Skye Hart PA-C  Cardiac Surgery [facility]  [facility]  Team Phone [phone]

## 2025-03-18 ENCOUNTER — APPOINTMENT (OUTPATIENT)
Dept: RADIOLOGY | Facility: HOSPITAL | Age: 65
DRG: 216 | End: 2025-03-18
Payer: OTHER GOVERNMENT

## 2025-03-18 ENCOUNTER — APPOINTMENT (OUTPATIENT)
Dept: CARDIOLOGY | Facility: HOSPITAL | Age: 65
DRG: 216 | End: 2025-03-18
Payer: OTHER GOVERNMENT

## 2025-03-18 ENCOUNTER — ANESTHESIA (OUTPATIENT)
Dept: OPERATING ROOM | Facility: HOSPITAL | Age: 65
End: 2025-03-18
Payer: OTHER GOVERNMENT

## 2025-03-18 ENCOUNTER — HOSPITAL ENCOUNTER (OUTPATIENT)
Dept: OPERATING ROOM | Facility: HOSPITAL | Age: 65
Discharge: HOME | End: 2025-03-18

## 2025-03-18 PROBLEM — R56.9 SEIZURES (MULTI): Status: ACTIVE | Noted: 2025-03-18

## 2025-03-18 PROBLEM — B18.2 CHRONIC HEPATITIS C VIRUS INFECTION (MULTI): Status: ACTIVE | Noted: 2025-03-18

## 2025-03-18 PROBLEM — G89.18 POST-OPERATIVE PAIN: Status: ACTIVE | Noted: 2025-03-18

## 2025-03-18 PROBLEM — R01.1 MURMUR: Status: ACTIVE | Noted: 2025-03-18

## 2025-03-18 LAB
ALBUMIN SERPL BCP-MCNC: 3.3 G/DL (ref 3.4–5)
ANION GAP BLDA CALCULATED.4IONS-SCNC: 11 MMO/L (ref 10–25)
ANION GAP BLDA CALCULATED.4IONS-SCNC: 12 MMO/L (ref 10–25)
ANION GAP BLDA CALCULATED.4IONS-SCNC: 12 MMO/L (ref 10–25)
ANION GAP BLDA CALCULATED.4IONS-SCNC: 6 MMO/L (ref 10–25)
ANION GAP BLDA CALCULATED.4IONS-SCNC: 7 MMO/L (ref 10–25)
ANION GAP BLDA CALCULATED.4IONS-SCNC: 7 MMO/L (ref 10–25)
ANION GAP BLDA CALCULATED.4IONS-SCNC: 8 MMO/L (ref 10–25)
ANION GAP BLDA CALCULATED.4IONS-SCNC: 8 MMO/L (ref 10–25)
ANION GAP BLDA CALCULATED.4IONS-SCNC: 9 MMO/L (ref 10–25)
ANION GAP BLDA CALCULATED.4IONS-SCNC: 9 MMO/L (ref 10–25)
ANION GAP BLDV CALCULATED.4IONS-SCNC: 9 MMOL/L (ref 10–25)
ANION GAP SERPL CALC-SCNC: 16 MMOL/L (ref 10–20)
APTT PPP: 30 SECONDS (ref 26–36)
ATRIAL RATE: 55 BPM
ATRIAL RATE: 75 BPM
BASE EXCESS BLDA CALC-SCNC: -0.3 MMOL/L (ref -2–3)
BASE EXCESS BLDA CALC-SCNC: -0.4 MMOL/L (ref -2–3)
BASE EXCESS BLDA CALC-SCNC: -0.4 MMOL/L (ref -2–3)
BASE EXCESS BLDA CALC-SCNC: -1 MMOL/L (ref -2–3)
BASE EXCESS BLDA CALC-SCNC: -1.6 MMOL/L (ref -2–3)
BASE EXCESS BLDA CALC-SCNC: -1.6 MMOL/L (ref -2–3)
BASE EXCESS BLDA CALC-SCNC: -2.2 MMOL/L (ref -2–3)
BASE EXCESS BLDA CALC-SCNC: -2.6 MMOL/L (ref -2–3)
BASE EXCESS BLDA CALC-SCNC: 0.2 MMOL/L (ref -2–3)
BASE EXCESS BLDA CALC-SCNC: 1 MMOL/L (ref -2–3)
BASE EXCESS BLDV CALC-SCNC: 0.2 MMOL/L (ref -2–3)
BODY TEMPERATURE: 37 DEGREES CELSIUS
BUN SERPL-MCNC: 10 MG/DL (ref 6–23)
CA-I BLDA-SCNC: 0.94 MMOL/L (ref 1.1–1.33)
CA-I BLDA-SCNC: 0.95 MMOL/L (ref 1.1–1.33)
CA-I BLDA-SCNC: 0.98 MMOL/L (ref 1.1–1.33)
CA-I BLDA-SCNC: 0.99 MMOL/L (ref 1.1–1.33)
CA-I BLDA-SCNC: 1 MMOL/L (ref 1.1–1.33)
CA-I BLDA-SCNC: 1.06 MMOL/L (ref 1.1–1.33)
CA-I BLDA-SCNC: 1.07 MMOL/L (ref 1.1–1.33)
CA-I BLDA-SCNC: 1.11 MMOL/L (ref 1.1–1.33)
CA-I BLDA-SCNC: 1.16 MMOL/L (ref 1.1–1.33)
CA-I BLDA-SCNC: 1.23 MMOL/L (ref 1.1–1.33)
CA-I BLDV-SCNC: 0.97 MMOL/L (ref 1.1–1.33)
CALCIUM SERPL-MCNC: 7.9 MG/DL (ref 8.6–10.6)
CFT FORM KAOLIN IND BLD RES TEG: 1.6 MIN (ref 0.8–2.1)
CFT FORM KAOLIN IND BLD RES TEG: 1.9 MIN (ref 0.8–2.1)
CHLORIDE BLDA-SCNC: 104 MMOL/L (ref 98–107)
CHLORIDE BLDA-SCNC: 105 MMOL/L (ref 98–107)
CHLORIDE BLDA-SCNC: 105 MMOL/L (ref 98–107)
CHLORIDE BLDA-SCNC: 106 MMOL/L (ref 98–107)
CHLORIDE BLDA-SCNC: 107 MMOL/L (ref 98–107)
CHLORIDE BLDV-SCNC: 105 MMOL/L (ref 98–107)
CHLORIDE SERPL-SCNC: 106 MMOL/L (ref 98–107)
CLOT ANGLE.KAOLIN INDUCED BLD RES TEG: 68 DEG (ref 63–78)
CLOT ANGLE.KAOLIN INDUCED BLD RES TEG: 70 DEG (ref 63–78)
CLOT INIT KAO IND P HEP NEUT BLD RES TEG: 5.5 MIN (ref 4.3–8.3)
CLOT INIT KAO IND P HEP NEUT BLD RES TEG: 5.6 MIN (ref 4.6–9.1)
CLOT INIT KAO IND P HEP NEUT BLD RES TEG: 6.1 MIN (ref 4.3–8.3)
CLOT INIT KAO IND P HEP NEUT BLD RES TEG: 6.2 MIN (ref 4.6–9.1)
CO2 SERPL-SCNC: 23 MMOL/L (ref 21–32)
COHGB MFR BLDA: 0.3 %
COHGB MFR BLDA: 0.4 %
COHGB MFR BLDA: 0.5 %
COHGB MFR BLDA: 0.5 %
COHGB MFR BLDA: 0.6 %
COHGB MFR BLDV: 0.9 %
CREAT SERPL-MCNC: 0.84 MG/DL (ref 0.5–1.3)
DO-HGB MFR BLDA: 0.4 % (ref 0–5)
DO-HGB MFR BLDA: 0.7 % (ref 0–5)
DO-HGB MFR BLDA: 0.8 % (ref 0–5)
DO-HGB MFR BLDA: 0.9 % (ref 0–5)
DO-HGB MFR BLDA: 0.9 % (ref 0–5)
DO-HGB MFR BLDA: 1.4 % (ref 0–5)
DO-HGB MFR BLDA: 2.6 % (ref 0–5)
EGFRCR SERPLBLD CKD-EPI 2021: >90 ML/MIN/1.73M*2
EJECTION FRACTION: 60 %
ERYTHROCYTE [DISTWIDTH] IN BLOOD BY AUTOMATED COUNT: 13.1 % (ref 11.5–14.5)
FIBRINOGEN BLD CALC-MCNC: 261 MG/DL (ref 278–581)
FIBRINOGEN BLD CALC-MCNC: 307 MG/DL (ref 278–581)
FIBRINOGEN PPP-MCNC: 175 MG/DL (ref 200–400)
GLUCOSE BLDA-MCNC: 104 MG/DL (ref 74–99)
GLUCOSE BLDA-MCNC: 114 MG/DL (ref 74–99)
GLUCOSE BLDA-MCNC: 128 MG/DL (ref 74–99)
GLUCOSE BLDA-MCNC: 128 MG/DL (ref 74–99)
GLUCOSE BLDA-MCNC: 131 MG/DL (ref 74–99)
GLUCOSE BLDA-MCNC: 133 MG/DL (ref 74–99)
GLUCOSE BLDA-MCNC: 159 MG/DL (ref 74–99)
GLUCOSE BLDA-MCNC: 180 MG/DL (ref 74–99)
GLUCOSE BLDA-MCNC: 196 MG/DL (ref 74–99)
GLUCOSE BLDA-MCNC: 85 MG/DL (ref 74–99)
GLUCOSE BLDV-MCNC: 118 MG/DL (ref 74–99)
GLUCOSE SERPL-MCNC: 192 MG/DL (ref 74–99)
HCO3 BLDA-SCNC: 21.8 MMOL/L (ref 22–26)
HCO3 BLDA-SCNC: 23.2 MMOL/L (ref 22–26)
HCO3 BLDA-SCNC: 23.7 MMOL/L (ref 22–26)
HCO3 BLDA-SCNC: 24.8 MMOL/L (ref 22–26)
HCO3 BLDA-SCNC: 24.8 MMOL/L (ref 22–26)
HCO3 BLDA-SCNC: 25.2 MMOL/L (ref 22–26)
HCO3 BLDA-SCNC: 25.4 MMOL/L (ref 22–26)
HCO3 BLDA-SCNC: 25.4 MMOL/L (ref 22–26)
HCO3 BLDA-SCNC: 26 MMOL/L (ref 22–26)
HCO3 BLDA-SCNC: 26 MMOL/L (ref 22–26)
HCO3 BLDV-SCNC: 26.4 MMOL/L (ref 22–26)
HCT VFR BLD AUTO: 41 % (ref 41–52)
HCT VFR BLD EST: 33 % (ref 41–52)
HCT VFR BLD EST: 33 % (ref 41–52)
HCT VFR BLD EST: 34 % (ref 41–52)
HCT VFR BLD EST: 35 % (ref 41–52)
HCT VFR BLD EST: 36 % (ref 41–52)
HCT VFR BLD EST: 38 % (ref 41–52)
HCT VFR BLD EST: 41 % (ref 41–52)
HCT VFR BLD EST: 41 % (ref 41–52)
HCT VFR BLD EST: 42 % (ref 41–52)
HCT VFR BLD EST: 44 % (ref 41–52)
HCT VFR BLD EST: 44 % (ref 41–52)
HGB BLD-MCNC: 13.8 G/DL (ref 13.5–17.5)
HGB BLDA-MCNC: 11 G/DL (ref 13.5–17.5)
HGB BLDA-MCNC: 11 G/DL (ref 13.5–17.5)
HGB BLDA-MCNC: 11.4 G/DL (ref 13.5–17.5)
HGB BLDA-MCNC: 11.4 G/DL (ref 13.5–17.5)
HGB BLDA-MCNC: 11.7 G/DL (ref 13.5–17.5)
HGB BLDA-MCNC: 11.7 G/DL (ref 13.5–17.5)
HGB BLDA-MCNC: 12 G/DL (ref 13.5–17.5)
HGB BLDA-MCNC: 12 G/DL (ref 13.5–17.5)
HGB BLDA-MCNC: 12.7 G/DL (ref 13.5–17.5)
HGB BLDA-MCNC: 12.7 G/DL (ref 13.5–17.5)
HGB BLDA-MCNC: 13.6 G/DL (ref 13.5–17.5)
HGB BLDA-MCNC: 13.7 G/DL (ref 13.5–17.5)
HGB BLDA-MCNC: 14.1 G/DL (ref 13.5–17.5)
HGB BLDA-MCNC: 14.1 G/DL (ref 13.5–17.5)
HGB BLDA-MCNC: 14.5 G/DL (ref 13.5–17.5)
HGB BLDA-MCNC: 14.5 G/DL (ref 13.5–17.5)
HGB BLDA-MCNC: 14.6 G/DL (ref 13.5–17.5)
HGB BLDV-MCNC: 11.1 G/DL (ref 13.5–17.5)
INHALED O2 CONCENTRATION: 100 %
INHALED O2 CONCENTRATION: 40 %
INHALED O2 CONCENTRATION: 50 %
INHALED O2 CONCENTRATION: 80 %
INR PPP: 1.2 (ref 0.9–1.1)
LACTATE BLDA-SCNC: 0.6 MMOL/L (ref 0.4–2)
LACTATE BLDA-SCNC: 0.7 MMOL/L (ref 0.4–2)
LACTATE BLDA-SCNC: 0.8 MMOL/L (ref 0.4–2)
LACTATE BLDA-SCNC: 1.2 MMOL/L (ref 0.4–2)
LACTATE BLDA-SCNC: 1.2 MMOL/L (ref 0.4–2)
LACTATE BLDA-SCNC: 1.4 MMOL/L (ref 0.4–2)
LACTATE BLDA-SCNC: 2 MMOL/L (ref 0.4–2)
LACTATE BLDA-SCNC: 2.5 MMOL/L (ref 0.4–2)
LACTATE BLDV-SCNC: 0.8 MMOL/L (ref 0.4–2)
MA KAOLIN BLD RES TEG: 52 MM (ref 52–69)
MA KAOLIN BLD RES TEG: 57 MM (ref 52–69)
MA KAOLIN+TF BLD RES TEG: 55 MM (ref 52–70)
MA KAOLIN+TF BLD RES TEG: 58 MM (ref 52–70)
MA TF IND+IIB-IIIA INH BLD RES TEG: 14 MM (ref 15–32)
MA TF IND+IIB-IIIA INH BLD RES TEG: 17 MM (ref 15–32)
MAGNESIUM SERPL-MCNC: 3.11 MG/DL (ref 1.6–2.4)
MCH RBC QN AUTO: 31.7 PG (ref 26–34)
MCHC RBC AUTO-ENTMCNC: 33.7 G/DL (ref 32–36)
MCV RBC AUTO: 94 FL (ref 80–100)
METHGB MFR BLDA: 0.5 % (ref 0–1.5)
METHGB MFR BLDA: 0.6 % (ref 0–1.5)
METHGB MFR BLDA: 0.6 % (ref 0–1.5)
METHGB MFR BLDA: 0.8 % (ref 0–1.5)
METHGB MFR BLDA: 0.8 % (ref 0–1.5)
METHGB MFR BLDA: 1 % (ref 0–1.5)
METHGB MFR BLDA: 1 % (ref 0–1.5)
METHGB MFR BLDV: 0.6 % (ref 0–1.5)
NRBC BLD-RTO: 0 /100 WBCS (ref 0–0)
OXYHGB MFR BLDA: 95.9 % (ref 94–98)
OXYHGB MFR BLDA: 95.9 % (ref 94–98)
OXYHGB MFR BLDA: 97 % (ref 94–98)
OXYHGB MFR BLDA: 97.4 % (ref 94–98)
OXYHGB MFR BLDA: 97.4 % (ref 94–98)
OXYHGB MFR BLDA: 97.5 % (ref 94–98)
OXYHGB MFR BLDA: 97.8 % (ref 94–98)
OXYHGB MFR BLDA: 97.9 % (ref 94–98)
OXYHGB MFR BLDA: 98.2 % (ref 94–98)
OXYHGB MFR BLDA: 98.2 % (ref 94–98)
OXYHGB MFR BLDA: 98.5 % (ref 94–98)
OXYHGB MFR BLDA: 98.5 % (ref 94–98)
OXYHGB MFR BLDV: 90.4 % (ref 45–75)
P AXIS: 0 DEGREES
P AXIS: 51 DEGREES
P OFFSET: 174 MS
P OFFSET: 177 MS
P ONSET: 121 MS
P ONSET: 127 MS
PCO2 BLDA: 36 MM HG (ref 38–42)
PCO2 BLDA: 41 MM HG (ref 38–42)
PCO2 BLDA: 41 MM HG (ref 38–42)
PCO2 BLDA: 42 MM HG (ref 38–42)
PCO2 BLDA: 42 MM HG (ref 38–42)
PCO2 BLDA: 45 MM HG (ref 38–42)
PCO2 BLDA: 46 MM HG (ref 38–42)
PCO2 BLDA: 50 MM HG (ref 38–42)
PCO2 BLDV: 49 MM HG (ref 41–51)
PH BLDA: 7.31 PH (ref 7.38–7.42)
PH BLDA: 7.35 PH (ref 7.38–7.42)
PH BLDA: 7.36 PH (ref 7.38–7.42)
PH BLDA: 7.37 PH (ref 7.38–7.42)
PH BLDA: 7.38 PH (ref 7.38–7.42)
PH BLDA: 7.39 PH (ref 7.38–7.42)
PH BLDA: 7.4 PH (ref 7.38–7.42)
PH BLDV: 7.34 PH (ref 7.33–7.43)
PHOSPHATE SERPL-MCNC: 2.2 MG/DL (ref 2.5–4.9)
PLATELET # BLD AUTO: 144 X10*3/UL (ref 150–450)
PO2 BLDA: 135 MM HG (ref 85–95)
PO2 BLDA: 140 MM HG (ref 85–95)
PO2 BLDA: 199 MM HG (ref 85–95)
PO2 BLDA: 212 MM HG (ref 85–95)
PO2 BLDA: 349 MM HG (ref 85–95)
PO2 BLDA: 420 MM HG (ref 85–95)
PO2 BLDA: 430 MM HG (ref 85–95)
PO2 BLDA: 434 MM HG (ref 85–95)
PO2 BLDA: 463 MM HG (ref 85–95)
PO2 BLDA: 84 MM HG (ref 85–95)
PO2 BLDV: 58 MM HG (ref 35–45)
POTASSIUM BLDA-SCNC: 3.7 MMOL/L (ref 3.5–5.3)
POTASSIUM BLDA-SCNC: 3.7 MMOL/L (ref 3.5–5.3)
POTASSIUM BLDA-SCNC: 3.8 MMOL/L (ref 3.5–5.3)
POTASSIUM BLDA-SCNC: 4.1 MMOL/L (ref 3.5–5.3)
POTASSIUM BLDA-SCNC: 4.2 MMOL/L (ref 3.5–5.3)
POTASSIUM BLDA-SCNC: 4.2 MMOL/L (ref 3.5–5.3)
POTASSIUM BLDA-SCNC: 4.8 MMOL/L (ref 3.5–5.3)
POTASSIUM BLDA-SCNC: 4.8 MMOL/L (ref 3.5–5.3)
POTASSIUM BLDA-SCNC: 4.9 MMOL/L (ref 3.5–5.3)
POTASSIUM BLDA-SCNC: 5.2 MMOL/L (ref 3.5–5.3)
POTASSIUM BLDV-SCNC: 5.3 MMOL/L (ref 3.5–5.3)
POTASSIUM SERPL-SCNC: 3.7 MMOL/L (ref 3.5–5.3)
PR INTERVAL: 184 MS
PR INTERVAL: 198 MS
PROTHROMBIN TIME: 13.3 SECONDS (ref 9.8–12.4)
Q ONSET: 219 MS
Q ONSET: 220 MS
QRS COUNT: 12 BEATS
QRS COUNT: 9 BEATS
QRS DURATION: 78 MS
QRS DURATION: 80 MS
QT INTERVAL: 446 MS
QT INTERVAL: 450 MS
QTC CALCULATION(BAZETT): 430 MS
QTC CALCULATION(BAZETT): 498 MS
QTC FREDERICIA: 437 MS
QTC FREDERICIA: 480 MS
R AXIS: 22 DEGREES
R AXIS: 77 DEGREES
RBC # BLD AUTO: 4.35 X10*6/UL (ref 4.5–5.9)
SAO2 % BLDA: 100 % (ref 94–100)
SAO2 % BLDA: 100 % (ref 94–100)
SAO2 % BLDA: 97 % (ref 94–100)
SAO2 % BLDA: 99 % (ref 94–100)
SAO2 % BLDV: 92 % (ref 45–75)
SODIUM BLDA-SCNC: 133 MMOL/L (ref 136–145)
SODIUM BLDA-SCNC: 135 MMOL/L (ref 136–145)
SODIUM BLDA-SCNC: 136 MMOL/L (ref 136–145)
SODIUM BLDV-SCNC: 135 MMOL/L (ref 136–145)
SODIUM SERPL-SCNC: 141 MMOL/L (ref 136–145)
T AXIS: 32 DEGREES
T AXIS: 53 DEGREES
T OFFSET: 442 MS
T OFFSET: 445 MS
TEST COMMENT: ABNORMAL
TEST COMMENT: NORMAL
VENTRICULAR RATE: 55 BPM
VENTRICULAR RATE: 75 BPM
WBC # BLD AUTO: 13.1 X10*3/UL (ref 4.4–11.3)

## 2025-03-18 PROCEDURE — 2780000003 HC OR 278 NO HCPCS: Performed by: STUDENT IN AN ORGANIZED HEALTH CARE EDUCATION/TRAINING PROGRAM

## 2025-03-18 PROCEDURE — 99221 1ST HOSP IP/OBS SF/LOW 40: CPT | Performed by: ANESTHESIOLOGY

## 2025-03-18 PROCEDURE — 93010 ELECTROCARDIOGRAM REPORT: CPT | Performed by: INTERNAL MEDICINE

## 2025-03-18 PROCEDURE — 85384 FIBRINOGEN ACTIVITY: CPT

## 2025-03-18 PROCEDURE — 3600000011 HC PERFUSION TIME - INITIAL BASE CHARGE: Performed by: STUDENT IN AN ORGANIZED HEALTH CARE EDUCATION/TRAINING PROGRAM

## 2025-03-18 PROCEDURE — A4312 CATH W/O BAG 2-WAY SILICONE: HCPCS | Performed by: STUDENT IN AN ORGANIZED HEALTH CARE EDUCATION/TRAINING PROGRAM

## 2025-03-18 PROCEDURE — 85027 COMPLETE CBC AUTOMATED: CPT

## 2025-03-18 PROCEDURE — 71045 X-RAY EXAM CHEST 1 VIEW: CPT

## 2025-03-18 PROCEDURE — 76942 ECHO GUIDE FOR BIOPSY: CPT | Performed by: ANESTHESIOLOGY

## 2025-03-18 PROCEDURE — 3600000012 HC PERFUSION TIME - EACH INCREMENTAL 1 MINUTE: Performed by: STUDENT IN AN ORGANIZED HEALTH CARE EDUCATION/TRAINING PROGRAM

## 2025-03-18 PROCEDURE — 02UG0JZ SUPPLEMENT MITRAL VALVE WITH SYNTHETIC SUBSTITUTE, OPEN APPROACH: ICD-10-PCS | Performed by: STUDENT IN AN ORGANIZED HEALTH CARE EDUCATION/TRAINING PROGRAM

## 2025-03-18 PROCEDURE — 82435 ASSAY OF BLOOD CHLORIDE: CPT

## 2025-03-18 PROCEDURE — 83050 HGB METHEMOGLOBIN QUAN: CPT

## 2025-03-18 PROCEDURE — 3700000002 HC GENERAL ANESTHESIA TIME - EACH INCREMENTAL 1 MINUTE: Performed by: STUDENT IN AN ORGANIZED HEALTH CARE EDUCATION/TRAINING PROGRAM

## 2025-03-18 PROCEDURE — 94762 N-INVAS EAR/PLS OXIMTRY CONT: CPT

## 2025-03-18 PROCEDURE — 33427 REPAIR OF MITRAL VALVE: CPT | Performed by: STUDENT IN AN ORGANIZED HEALTH CARE EDUCATION/TRAINING PROGRAM

## 2025-03-18 PROCEDURE — 3700000001 HC GENERAL ANESTHESIA TIME - INITIAL BASE CHARGE: Performed by: STUDENT IN AN ORGANIZED HEALTH CARE EDUCATION/TRAINING PROGRAM

## 2025-03-18 PROCEDURE — 2500000005 HC RX 250 GENERAL PHARMACY W/O HCPCS: Performed by: STUDENT IN AN ORGANIZED HEALTH CARE EDUCATION/TRAINING PROGRAM

## 2025-03-18 PROCEDURE — 85610 PROTHROMBIN TIME: CPT

## 2025-03-18 PROCEDURE — 8E0W4CZ ROBOTIC ASSISTED PROCEDURE OF TRUNK REGION, PERCUTANEOUS ENDOSCOPIC APPROACH: ICD-10-PCS | Performed by: STUDENT IN AN ORGANIZED HEALTH CARE EDUCATION/TRAINING PROGRAM

## 2025-03-18 PROCEDURE — 82810 BLOOD GASES O2 SAT ONLY: CPT

## 2025-03-18 PROCEDURE — 5A1221Z PERFORMANCE OF CARDIAC OUTPUT, CONTINUOUS: ICD-10-PCS | Performed by: STUDENT IN AN ORGANIZED HEALTH CARE EDUCATION/TRAINING PROGRAM

## 2025-03-18 PROCEDURE — 33427 REPAIR OF MITRAL VALVE: CPT | Performed by: THORACIC SURGERY (CARDIOTHORACIC VASCULAR SURGERY)

## 2025-03-18 PROCEDURE — 2500000005 HC RX 250 GENERAL PHARMACY W/O HCPCS: Performed by: ANESTHESIOLOGY

## 2025-03-18 PROCEDURE — 2500000002 HC RX 250 W HCPCS SELF ADMINISTERED DRUGS (ALT 637 FOR MEDICARE OP, ALT 636 FOR OP/ED)

## 2025-03-18 PROCEDURE — 64450 NJX AA&/STRD OTHER PN/BRANCH: CPT | Performed by: ANESTHESIOLOGY

## 2025-03-18 PROCEDURE — 99291 CRITICAL CARE FIRST HOUR: CPT

## 2025-03-18 PROCEDURE — 76937 US GUIDE VASCULAR ACCESS: CPT | Performed by: ANESTHESIOLOGY

## 2025-03-18 PROCEDURE — 36620 INSERTION CATHETER ARTERY: CPT | Performed by: ANESTHESIOLOGY

## 2025-03-18 PROCEDURE — 2500000004 HC RX 250 GENERAL PHARMACY W/ HCPCS (ALT 636 FOR OP/ED)

## 2025-03-18 PROCEDURE — 2500000004 HC RX 250 GENERAL PHARMACY W/ HCPCS (ALT 636 FOR OP/ED): Performed by: ANESTHESIOLOGY

## 2025-03-18 PROCEDURE — 37799 UNLISTED PX VASCULAR SURGERY: CPT

## 2025-03-18 PROCEDURE — 85018 HEMOGLOBIN: CPT

## 2025-03-18 PROCEDURE — 36556 INSERT NON-TUNNEL CV CATH: CPT | Performed by: ANESTHESIOLOGY

## 2025-03-18 PROCEDURE — 2500000004 HC RX 250 GENERAL PHARMACY W/ HCPCS (ALT 636 FOR OP/ED): Performed by: STUDENT IN AN ORGANIZED HEALTH CARE EDUCATION/TRAINING PROGRAM

## 2025-03-18 PROCEDURE — 93005 ELECTROCARDIOGRAM TRACING: CPT

## 2025-03-18 PROCEDURE — 3600000018 HC OR TIME - INITIAL BASE CHARGE - PROCEDURE LEVEL SIX: Performed by: STUDENT IN AN ORGANIZED HEALTH CARE EDUCATION/TRAINING PROGRAM

## 2025-03-18 PROCEDURE — 71045 X-RAY EXAM CHEST 1 VIEW: CPT | Performed by: RADIOLOGY

## 2025-03-18 PROCEDURE — 3600000017 HC OR TIME - EACH INCREMENTAL 1 MINUTE - PROCEDURE LEVEL SIX: Performed by: STUDENT IN AN ORGANIZED HEALTH CARE EDUCATION/TRAINING PROGRAM

## 2025-03-18 PROCEDURE — 83735 ASSAY OF MAGNESIUM: CPT

## 2025-03-18 PROCEDURE — 82805 BLOOD GASES W/O2 SATURATION: CPT

## 2025-03-18 PROCEDURE — 2500000001 HC RX 250 WO HCPCS SELF ADMINISTERED DRUGS (ALT 637 FOR MEDICARE OP): Performed by: NURSE PRACTITIONER

## 2025-03-18 PROCEDURE — C1726 CATH, BAL DIL, NON-VASCULAR: HCPCS | Performed by: STUDENT IN AN ORGANIZED HEALTH CARE EDUCATION/TRAINING PROGRAM

## 2025-03-18 PROCEDURE — 82947 ASSAY GLUCOSE BLOOD QUANT: CPT

## 2025-03-18 PROCEDURE — 83605 ASSAY OF LACTIC ACID: CPT

## 2025-03-18 PROCEDURE — 2720000007 HC OR 272 NO HCPCS: Performed by: STUDENT IN AN ORGANIZED HEALTH CARE EDUCATION/TRAINING PROGRAM

## 2025-03-18 PROCEDURE — A4649 SURGICAL SUPPLIES: HCPCS | Performed by: STUDENT IN AN ORGANIZED HEALTH CARE EDUCATION/TRAINING PROGRAM

## 2025-03-18 PROCEDURE — C1889 IMPLANT/INSERT DEVICE, NOC: HCPCS | Performed by: STUDENT IN AN ORGANIZED HEALTH CARE EDUCATION/TRAINING PROGRAM

## 2025-03-18 PROCEDURE — P9045 ALBUMIN (HUMAN), 5%, 250 ML: HCPCS | Mod: JZ

## 2025-03-18 PROCEDURE — 85347 COAGULATION TIME ACTIVATED: CPT

## 2025-03-18 PROCEDURE — 84295 ASSAY OF SERUM SODIUM: CPT

## 2025-03-18 PROCEDURE — 2020000001 HC ICU ROOM DAILY

## 2025-03-18 DEVICE — IMPLANTABLE DEVICE: Type: IMPLANTABLE DEVICE | Site: HEART | Status: FUNCTIONAL

## 2025-03-18 DEVICE — IMPLANTABLE DEVICE: Type: IMPLANTABLE DEVICE | Site: HEART | Status: NON-FUNCTIONAL

## 2025-03-18 RX ORDER — HEPARIN SODIUM 1000 [USP'U]/ML
INJECTION, SOLUTION INTRAVENOUS; SUBCUTANEOUS AS NEEDED
Status: DISCONTINUED | OUTPATIENT
Start: 2025-03-18 | End: 2025-03-18

## 2025-03-18 RX ORDER — CALCIUM GLUCONATE 20 MG/ML
2 INJECTION, SOLUTION INTRAVENOUS EVERY 6 HOURS PRN
Status: DISCONTINUED | OUTPATIENT
Start: 2025-03-18 | End: 2025-03-19

## 2025-03-18 RX ORDER — FENTANYL CITRATE 50 UG/ML
INJECTION, SOLUTION INTRAMUSCULAR; INTRAVENOUS AS NEEDED
Status: DISCONTINUED | OUTPATIENT
Start: 2025-03-18 | End: 2025-03-18

## 2025-03-18 RX ORDER — ALBUMIN HUMAN 50 G/1000ML
12.5 SOLUTION INTRAVENOUS ONCE
Status: COMPLETED | OUTPATIENT
Start: 2025-03-18 | End: 2025-03-18

## 2025-03-18 RX ORDER — EPINEPHRINE IN 0.9 % SOD CHLOR 4MG/250ML
0.02 PLASTIC BAG, INJECTION (ML) INTRAVENOUS CONTINUOUS
Status: DISCONTINUED | OUTPATIENT
Start: 2025-03-18 | End: 2025-03-18

## 2025-03-18 RX ORDER — PROPOFOL 10 MG/ML
0-50 INJECTION, EMULSION INTRAVENOUS CONTINUOUS
Status: DISCONTINUED | OUTPATIENT
Start: 2025-03-18 | End: 2025-03-18

## 2025-03-18 RX ORDER — ROPIVACAINE HYDROCHLORIDE 5 MG/ML
INJECTION, SOLUTION EPIDURAL; INFILTRATION; PERINEURAL AS NEEDED
Status: DISCONTINUED | OUTPATIENT
Start: 2025-03-18 | End: 2025-03-18

## 2025-03-18 RX ORDER — POTASSIUM CHLORIDE 29.8 MG/ML
40 INJECTION INTRAVENOUS EVERY 6 HOURS PRN
Status: DISCONTINUED | OUTPATIENT
Start: 2025-03-18 | End: 2025-03-19

## 2025-03-18 RX ORDER — CEFAZOLIN SODIUM 2 G/100ML
2 INJECTION, SOLUTION INTRAVENOUS EVERY 8 HOURS
Status: COMPLETED | OUTPATIENT
Start: 2025-03-18 | End: 2025-03-20

## 2025-03-18 RX ORDER — SODIUM CHLORIDE 0.9 G/100ML
INJECTION, SOLUTION IRRIGATION AS NEEDED
Status: DISCONTINUED | OUTPATIENT
Start: 2025-03-18 | End: 2025-03-18 | Stop reason: HOSPADM

## 2025-03-18 RX ORDER — ROPIVACAINE IN 0.9% SOD CHL/PF 0.2 %
10 PLASTIC BAG, INJECTION (ML) EPIDURAL CONTINUOUS
Status: DISCONTINUED | OUTPATIENT
Start: 2025-03-18 | End: 2025-03-20

## 2025-03-18 RX ORDER — POTASSIUM CHLORIDE 1.5 G/1.58G
20 POWDER, FOR SOLUTION ORAL EVERY 6 HOURS PRN
Status: DISCONTINUED | OUTPATIENT
Start: 2025-03-18 | End: 2025-03-19

## 2025-03-18 RX ORDER — ROCURONIUM BROMIDE 10 MG/ML
INJECTION, SOLUTION INTRAVENOUS AS NEEDED
Status: DISCONTINUED | OUTPATIENT
Start: 2025-03-18 | End: 2025-03-18

## 2025-03-18 RX ORDER — OXYCODONE HYDROCHLORIDE 5 MG/1
5 TABLET ORAL EVERY 4 HOURS PRN
Status: DISCONTINUED | OUTPATIENT
Start: 2025-03-18 | End: 2025-03-23 | Stop reason: HOSPADM

## 2025-03-18 RX ORDER — INSULIN LISPRO 100 [IU]/ML
0-15 INJECTION, SOLUTION INTRAVENOUS; SUBCUTANEOUS EVERY 4 HOURS
Status: DISCONTINUED | OUTPATIENT
Start: 2025-03-18 | End: 2025-03-19

## 2025-03-18 RX ORDER — MAGNESIUM SULFATE HEPTAHYDRATE 500 MG/ML
INJECTION, SOLUTION INTRAMUSCULAR; INTRAVENOUS AS NEEDED
Status: DISCONTINUED | OUTPATIENT
Start: 2025-03-18 | End: 2025-03-18

## 2025-03-18 RX ORDER — MANNITOL 20 G/100ML
INJECTION, SOLUTION INTRAVENOUS AS NEEDED
Status: DISCONTINUED | OUTPATIENT
Start: 2025-03-18 | End: 2025-03-18

## 2025-03-18 RX ORDER — PANTOPRAZOLE SODIUM 40 MG/10ML
40 INJECTION, POWDER, LYOPHILIZED, FOR SOLUTION INTRAVENOUS
Status: DISCONTINUED | OUTPATIENT
Start: 2025-03-19 | End: 2025-03-19

## 2025-03-18 RX ORDER — POTASSIUM CHLORIDE 1.5 G/1.58G
40 POWDER, FOR SOLUTION ORAL EVERY 6 HOURS PRN
Status: DISCONTINUED | OUTPATIENT
Start: 2025-03-18 | End: 2025-03-19

## 2025-03-18 RX ORDER — LIDOCAINE HCL/PF 100 MG/5ML
SYRINGE (ML) INTRAVENOUS AS NEEDED
Status: DISCONTINUED | OUTPATIENT
Start: 2025-03-18 | End: 2025-03-18

## 2025-03-18 RX ORDER — SODIUM CHLORIDE, SODIUM LACTATE, POTASSIUM CHLORIDE, CALCIUM CHLORIDE 600; 310; 30; 20 MG/100ML; MG/100ML; MG/100ML; MG/100ML
5 INJECTION, SOLUTION INTRAVENOUS CONTINUOUS
Status: DISCONTINUED | OUTPATIENT
Start: 2025-03-18 | End: 2025-03-19

## 2025-03-18 RX ORDER — SODIUM CHLORIDE 9 MG/ML
INJECTION, SOLUTION INTRAVENOUS CONTINUOUS PRN
Status: DISCONTINUED | OUTPATIENT
Start: 2025-03-18 | End: 2025-03-18

## 2025-03-18 RX ORDER — ACETAMINOPHEN 10 MG/ML
1000 INJECTION, SOLUTION INTRAVENOUS EVERY 6 HOURS
OUTPATIENT
Start: 2025-03-18 | End: 2025-03-19

## 2025-03-18 RX ORDER — MAGNESIUM SULFATE HEPTAHYDRATE 40 MG/ML
4 INJECTION, SOLUTION INTRAVENOUS EVERY 6 HOURS PRN
Status: DISCONTINUED | OUTPATIENT
Start: 2025-03-18 | End: 2025-03-19

## 2025-03-18 RX ORDER — OXYCODONE HYDROCHLORIDE 10 MG/1
10 TABLET ORAL EVERY 4 HOURS PRN
Status: DISCONTINUED | OUTPATIENT
Start: 2025-03-18 | End: 2025-03-23

## 2025-03-18 RX ORDER — MAGNESIUM SULFATE HEPTAHYDRATE 40 MG/ML
2 INJECTION, SOLUTION INTRAVENOUS EVERY 6 HOURS PRN
Status: DISCONTINUED | OUTPATIENT
Start: 2025-03-18 | End: 2025-03-19

## 2025-03-18 RX ORDER — INDOMETHACIN 25 MG/1
CAPSULE ORAL AS NEEDED
Status: DISCONTINUED | OUTPATIENT
Start: 2025-03-18 | End: 2025-03-18

## 2025-03-18 RX ORDER — METHADONE IN SOD CHLOR,ISO-OSM 10 MG/ML
SYRINGE (ML) INTRAVENOUS AS NEEDED
Status: DISCONTINUED | OUTPATIENT
Start: 2025-03-18 | End: 2025-03-18

## 2025-03-18 RX ORDER — NOREPINEPHRINE BITARTRATE 0.03 MG/ML
INJECTION, SOLUTION INTRAVENOUS CONTINUOUS PRN
Status: DISCONTINUED | OUTPATIENT
Start: 2025-03-18 | End: 2025-03-18

## 2025-03-18 RX ORDER — EPINEPHRINE IN 0.9 % SOD CHLOR 4MG/250ML
PLASTIC BAG, INJECTION (ML) INTRAVENOUS CONTINUOUS PRN
Status: DISCONTINUED | OUTPATIENT
Start: 2025-03-18 | End: 2025-03-18

## 2025-03-18 RX ORDER — PANTOPRAZOLE SODIUM 40 MG/1
40 TABLET, DELAYED RELEASE ORAL
Status: DISCONTINUED | OUTPATIENT
Start: 2025-03-19 | End: 2025-03-19

## 2025-03-18 RX ORDER — SODIUM CHLORIDE, SODIUM GLUCONATE, SODIUM ACETATE, POTASSIUM CHLORIDE AND MAGNESIUM CHLORIDE 30; 37; 368; 526; 502 MG/100ML; MG/100ML; MG/100ML; MG/100ML; MG/100ML
INJECTION, SOLUTION INTRAVENOUS CONTINUOUS PRN
Status: DISCONTINUED | OUTPATIENT
Start: 2025-03-18 | End: 2025-03-18

## 2025-03-18 RX ORDER — ACETAMINOPHEN 325 MG/1
975 TABLET ORAL EVERY 6 HOURS
Status: DISCONTINUED | OUTPATIENT
Start: 2025-03-19 | End: 2025-03-19

## 2025-03-18 RX ORDER — NAPROXEN SODIUM 220 MG/1
81 TABLET, FILM COATED ORAL DAILY
Status: DISCONTINUED | OUTPATIENT
Start: 2025-03-19 | End: 2025-03-19

## 2025-03-18 RX ORDER — NOREPINEPHRINE BITARTRATE/D5W 8 MG/250ML
0-.5 PLASTIC BAG, INJECTION (ML) INTRAVENOUS CONTINUOUS
Status: DISCONTINUED | OUTPATIENT
Start: 2025-03-18 | End: 2025-03-18

## 2025-03-18 RX ORDER — HYDROMORPHONE HYDROCHLORIDE 0.2 MG/ML
0.2 INJECTION INTRAMUSCULAR; INTRAVENOUS; SUBCUTANEOUS
Status: DISCONTINUED | OUTPATIENT
Start: 2025-03-18 | End: 2025-03-19

## 2025-03-18 RX ORDER — DEXMEDETOMIDINE HYDROCHLORIDE 4 UG/ML
0-1.5 INJECTION, SOLUTION INTRAVENOUS CONTINUOUS
Status: DISCONTINUED | OUTPATIENT
Start: 2025-03-18 | End: 2025-03-18

## 2025-03-18 RX ORDER — PROTAMINE SULFATE 10 MG/ML
INJECTION, SOLUTION INTRAVENOUS AS NEEDED
Status: DISCONTINUED | OUTPATIENT
Start: 2025-03-18 | End: 2025-03-18

## 2025-03-18 RX ORDER — ALBUMIN HUMAN 50 G/1000ML
25 SOLUTION INTRAVENOUS ONCE
Status: COMPLETED | OUTPATIENT
Start: 2025-03-18 | End: 2025-03-18

## 2025-03-18 RX ORDER — POTASSIUM CHLORIDE 20 MEQ/1
20 TABLET, EXTENDED RELEASE ORAL EVERY 6 HOURS PRN
Status: DISCONTINUED | OUTPATIENT
Start: 2025-03-18 | End: 2025-03-19

## 2025-03-18 RX ORDER — MIDAZOLAM HYDROCHLORIDE 1 MG/ML
INJECTION INTRAMUSCULAR; INTRAVENOUS AS NEEDED
Status: DISCONTINUED | OUTPATIENT
Start: 2025-03-18 | End: 2025-03-18

## 2025-03-18 RX ORDER — POLYETHYLENE GLYCOL 3350 17 G/17G
17 POWDER, FOR SOLUTION ORAL DAILY
Status: DISCONTINUED | OUTPATIENT
Start: 2025-03-18 | End: 2025-03-23 | Stop reason: HOSPADM

## 2025-03-18 RX ORDER — AMOXICILLIN 250 MG
2 CAPSULE ORAL 2 TIMES DAILY
Status: DISCONTINUED | OUTPATIENT
Start: 2025-03-18 | End: 2025-03-23 | Stop reason: HOSPADM

## 2025-03-18 RX ORDER — DEXTROSE 50 % IN WATER (D50W) INTRAVENOUS SYRINGE
25
Status: DISCONTINUED | OUTPATIENT
Start: 2025-03-18 | End: 2025-03-19

## 2025-03-18 RX ORDER — CALCIUM GLUCONATE 20 MG/ML
1 INJECTION, SOLUTION INTRAVENOUS EVERY 6 HOURS PRN
Status: DISCONTINUED | OUTPATIENT
Start: 2025-03-18 | End: 2025-03-19

## 2025-03-18 RX ORDER — SODIUM CHLORIDE, SODIUM LACTATE, POTASSIUM CHLORIDE, CALCIUM CHLORIDE 600; 310; 30; 20 MG/100ML; MG/100ML; MG/100ML; MG/100ML
30 INJECTION, SOLUTION INTRAVENOUS CONTINUOUS
Status: DISCONTINUED | OUTPATIENT
Start: 2025-03-18 | End: 2025-03-18

## 2025-03-18 RX ORDER — DEXMEDETOMIDINE HYDROCHLORIDE 4 UG/ML
INJECTION, SOLUTION INTRAVENOUS CONTINUOUS PRN
Status: DISCONTINUED | OUTPATIENT
Start: 2025-03-18 | End: 2025-03-18

## 2025-03-18 RX ORDER — CALCIUM CHLORIDE INJECTION 100 MG/ML
INJECTION, SOLUTION INTRAVENOUS AS NEEDED
Status: DISCONTINUED | OUTPATIENT
Start: 2025-03-18 | End: 2025-03-18

## 2025-03-18 RX ORDER — POTASSIUM CHLORIDE 14.9 MG/ML
20 INJECTION INTRAVENOUS EVERY 6 HOURS PRN
Status: DISCONTINUED | OUTPATIENT
Start: 2025-03-18 | End: 2025-03-19

## 2025-03-18 RX ORDER — CALCIUM GLUCONATE 20 MG/ML
2 INJECTION, SOLUTION INTRAVENOUS ONCE
Status: COMPLETED | OUTPATIENT
Start: 2025-03-18 | End: 2025-03-18

## 2025-03-18 RX ORDER — EPINEPHRINE IN 0.9 % SOD CHLOR 4MG/250ML
0-1 PLASTIC BAG, INJECTION (ML) INTRAVENOUS CONTINUOUS
Status: DISCONTINUED | OUTPATIENT
Start: 2025-03-18 | End: 2025-03-18

## 2025-03-18 RX ORDER — CEFAZOLIN 1 G/1
INJECTION, POWDER, FOR SOLUTION INTRAVENOUS AS NEEDED
Status: DISCONTINUED | OUTPATIENT
Start: 2025-03-18 | End: 2025-03-18

## 2025-03-18 RX ORDER — ACETAMINOPHEN 325 MG/1
650 TABLET ORAL EVERY 6 HOURS
Status: DISCONTINUED | OUTPATIENT
Start: 2025-03-18 | End: 2025-03-18

## 2025-03-18 RX ORDER — POTASSIUM CHLORIDE 20 MEQ/1
40 TABLET, EXTENDED RELEASE ORAL EVERY 6 HOURS PRN
Status: DISCONTINUED | OUTPATIENT
Start: 2025-03-18 | End: 2025-03-19

## 2025-03-18 RX ORDER — NALOXONE HYDROCHLORIDE 0.4 MG/ML
0.2 INJECTION, SOLUTION INTRAMUSCULAR; INTRAVENOUS; SUBCUTANEOUS EVERY 5 MIN PRN
Status: DISCONTINUED | OUTPATIENT
Start: 2025-03-18 | End: 2025-03-23 | Stop reason: HOSPADM

## 2025-03-18 RX ORDER — PROPOFOL 10 MG/ML
INJECTION, EMULSION INTRAVENOUS AS NEEDED
Status: DISCONTINUED | OUTPATIENT
Start: 2025-03-18 | End: 2025-03-18

## 2025-03-18 RX ORDER — GLYCOPYRROLATE 0.2 MG/ML
INJECTION INTRAMUSCULAR; INTRAVENOUS AS NEEDED
Status: DISCONTINUED | OUTPATIENT
Start: 2025-03-18 | End: 2025-03-18

## 2025-03-18 RX ADMIN — HYDROMORPHONE HYDROCHLORIDE 0.2 MG: 0.2 INJECTION, SOLUTION INTRAMUSCULAR; INTRAVENOUS; SUBCUTANEOUS at 15:56

## 2025-03-18 RX ADMIN — SODIUM CHLORIDE, SODIUM LACTATE, POTASSIUM CHLORIDE, AND CALCIUM CHLORIDE 30 ML/HR: .6; .31; .03; .02 INJECTION, SOLUTION INTRAVENOUS at 15:11

## 2025-03-18 RX ADMIN — SODIUM BICARBONATE 25 MEQ: 84 INJECTION, SOLUTION INTRAVENOUS at 12:03

## 2025-03-18 RX ADMIN — FENTANYL CITRATE 200 MCG: 50 INJECTION, SOLUTION INTRAMUSCULAR; INTRAVENOUS at 07:50

## 2025-03-18 RX ADMIN — CALCIUM GLUCONATE 2 G: 20 INJECTION, SOLUTION INTRAVENOUS at 15:23

## 2025-03-18 RX ADMIN — MAGNESIUM SULFATE HEPTAHYDRATE 2 G: 500 INJECTION, SOLUTION INTRAMUSCULAR; INTRAVENOUS at 12:03

## 2025-03-18 RX ADMIN — CALCIUM CHLORIDE 1 G: 100 INJECTION, SOLUTION INTRAVENOUS at 12:14

## 2025-03-18 RX ADMIN — CHLORHEXIDINE GLUCONATE 0.12% ORAL RINSE 15 ML: 1.2 LIQUID ORAL at 05:02

## 2025-03-18 RX ADMIN — LIDOCAINE HYDROCHLORIDE 50 MG: 20 INJECTION, SOLUTION INTRAVENOUS at 07:50

## 2025-03-18 RX ADMIN — CEFAZOLIN 2 G: 1 INJECTION, POWDER, FOR SOLUTION INTRAMUSCULAR; INTRAVENOUS at 12:38

## 2025-03-18 RX ADMIN — SODIUM CHLORIDE, SODIUM GLUCONATE, SODIUM ACETATE, POTASSIUM CHLORIDE AND MAGNESIUM CHLORIDE: 526; 502; 368; 37; 30 INJECTION, SOLUTION INTRAVENOUS at 07:31

## 2025-03-18 RX ADMIN — TRANEXAMIC ACID 1005 MG: 100 INJECTION INTRAVENOUS at 08:18

## 2025-03-18 RX ADMIN — PROPOFOL 20 MCG/KG/MIN: 10 INJECTION, EMULSION INTRAVENOUS at 13:22

## 2025-03-18 RX ADMIN — CEFAZOLIN SODIUM 2 G: 2 INJECTION, SOLUTION INTRAVENOUS at 15:23

## 2025-03-18 RX ADMIN — CEFAZOLIN 2 G: 1 INJECTION, POWDER, FOR SOLUTION INTRAMUSCULAR; INTRAVENOUS at 08:38

## 2025-03-18 RX ADMIN — NOREPINEPHRINE BITARTRATE 8 MCG: 1 INJECTION, SOLUTION, CONCENTRATE INTRAVENOUS at 08:37

## 2025-03-18 RX ADMIN — CEFAZOLIN SODIUM 2 G: 2 INJECTION, SOLUTION INTRAVENOUS at 20:56

## 2025-03-18 RX ADMIN — MANNITOL 60 ML: 20 INJECTION, SOLUTION INTRAVENOUS at 11:46

## 2025-03-18 RX ADMIN — ROCURONIUM 20 MG: 50 INJECTION, SOLUTION INTRAVENOUS at 09:16

## 2025-03-18 RX ADMIN — NOREPINEPHRINE BITARTRATE 16 MCG: 1 INJECTION, SOLUTION, CONCENTRATE INTRAVENOUS at 09:24

## 2025-03-18 RX ADMIN — EPINEPHRINE IN SODIUM CHLORIDE 0.03 MCG/KG/MIN: 16 INJECTION INTRAVENOUS at 15:19

## 2025-03-18 RX ADMIN — FENTANYL CITRATE 550 MCG: 50 INJECTION, SOLUTION INTRAMUSCULAR; INTRAVENOUS at 09:50

## 2025-03-18 RX ADMIN — PROTAMINE SULFATE 250 MG: 10 INJECTION, SOLUTION INTRAVENOUS at 12:23

## 2025-03-18 RX ADMIN — SODIUM CHLORIDE, SODIUM LACTATE, POTASSIUM CHLORIDE, AND CALCIUM CHLORIDE 500 ML: .6; .31; .03; .02 INJECTION, SOLUTION INTRAVENOUS at 15:10

## 2025-03-18 RX ADMIN — ROPIVACAINE HYDROCHLORIDE 30 ML: 5 INJECTION, SOLUTION EPIDURAL; INFILTRATION; PERINEURAL at 13:54

## 2025-03-18 RX ADMIN — NOREPINEPHRINE BITARTRATE 16 MCG: 1 INJECTION, SOLUTION, CONCENTRATE INTRAVENOUS at 12:32

## 2025-03-18 RX ADMIN — ALBUMIN HUMAN 25 G: 0.05 INJECTION, SOLUTION INTRAVENOUS at 16:17

## 2025-03-18 RX ADMIN — INSULIN LISPRO 5 UNITS: 100 INJECTION, SOLUTION INTRAVENOUS; SUBCUTANEOUS at 20:55

## 2025-03-18 RX ADMIN — EPINEPHRINE IN SODIUM CHLORIDE 0.03 MCG/KG/MIN: 16 INJECTION INTRAVENOUS at 12:15

## 2025-03-18 RX ADMIN — Medication 0.01 MCG/KG/MIN: at 12:29

## 2025-03-18 RX ADMIN — SODIUM CHLORIDE, SODIUM LACTATE, POTASSIUM CHLORIDE, AND CALCIUM CHLORIDE 5 ML/HR: .6; .31; .03; .02 INJECTION, SOLUTION INTRAVENOUS at 15:10

## 2025-03-18 RX ADMIN — GLYCOPYRROLATE 0.4 MG: 0.2 INJECTION INTRAMUSCULAR; INTRAVENOUS at 08:04

## 2025-03-18 RX ADMIN — DEXAMETHASONE SODIUM PHOSPHATE 4 MG: 4 INJECTION INTRA-ARTICULAR; INTRALESIONAL; INTRAMUSCULAR; INTRAVENOUS; SOFT TISSUE at 13:27

## 2025-03-18 RX ADMIN — GLYCOPYRROLATE 0.2 MG: 0.2 INJECTION INTRAMUSCULAR; INTRAVENOUS at 12:27

## 2025-03-18 RX ADMIN — Medication 10 MG: at 09:02

## 2025-03-18 RX ADMIN — ALBUMIN HUMAN 12.5 G: 0.05 INJECTION, SOLUTION INTRAVENOUS at 17:19

## 2025-03-18 RX ADMIN — HEPARIN SODIUM 26000 UNITS: 1000 INJECTION INTRAVENOUS; SUBCUTANEOUS at 09:19

## 2025-03-18 RX ADMIN — POTASSIUM CHLORIDE 40 MEQ: 29.8 INJECTION, SOLUTION INTRAVENOUS at 17:20

## 2025-03-18 RX ADMIN — DEXMEDETOMIDINE HYDROCHLORIDE 0.4 MCG/KG/HR: 4 INJECTION, SOLUTION INTRAVENOUS at 08:29

## 2025-03-18 RX ADMIN — ROCURONIUM 70 MG: 50 INJECTION, SOLUTION INTRAVENOUS at 07:50

## 2025-03-18 RX ADMIN — PROPOFOL 150 MG: 10 INJECTION, EMULSION INTRAVENOUS at 07:50

## 2025-03-18 RX ADMIN — NOREPINEPHRINE BITARTRATE 8 MCG: 1 INJECTION, SOLUTION, CONCENTRATE INTRAVENOUS at 08:08

## 2025-03-18 RX ADMIN — MIDAZOLAM HYDROCHLORIDE 1 MG: 2 INJECTION, SOLUTION INTRAMUSCULAR; INTRAVENOUS at 07:31

## 2025-03-18 RX ADMIN — SODIUM CHLORIDE, SODIUM LACTATE, POTASSIUM CHLORIDE, AND CALCIUM CHLORIDE 500 ML: .6; .31; .03; .02 INJECTION, SOLUTION INTRAVENOUS at 16:17

## 2025-03-18 RX ADMIN — SODIUM CHLORIDE: 9 INJECTION, SOLUTION INTRAVENOUS at 08:26

## 2025-03-18 RX ADMIN — NOREPINEPHRINE BITARTRATE 8 MCG: 1 INJECTION, SOLUTION, CONCENTRATE INTRAVENOUS at 08:50

## 2025-03-18 RX ADMIN — NOREPINEPHRINE BITARTRATE 8 MCG: 1 INJECTION, SOLUTION, CONCENTRATE INTRAVENOUS at 08:46

## 2025-03-18 RX ADMIN — NOREPINEPHRINE BITARTRATE 8 MCG: 1 INJECTION, SOLUTION, CONCENTRATE INTRAVENOUS at 08:00

## 2025-03-18 SDOH — HEALTH STABILITY: MENTAL HEALTH: CURRENT SMOKER: 0

## 2025-03-18 ASSESSMENT — PAIN - FUNCTIONAL ASSESSMENT
PAIN_FUNCTIONAL_ASSESSMENT: 0-10
PAIN_FUNCTIONAL_ASSESSMENT: CPOT (CRITICAL CARE PAIN OBSERVATION TOOL)
PAIN_FUNCTIONAL_ASSESSMENT: 0-10

## 2025-03-18 ASSESSMENT — PAIN SCALES - GENERAL
PAINLEVEL_OUTOF10: 0 - NO PAIN
PAINLEVEL_OUTOF10: 0 - NO PAIN

## 2025-03-18 ASSESSMENT — COLUMBIA-SUICIDE SEVERITY RATING SCALE - C-SSRS
1. IN THE PAST MONTH, HAVE YOU WISHED YOU WERE DEAD OR WISHED YOU COULD GO TO SLEEP AND NOT WAKE UP?: NO
6. HAVE YOU EVER DONE ANYTHING, STARTED TO DO ANYTHING, OR PREPARED TO DO ANYTHING TO END YOUR LIFE?: NO
2. HAVE YOU ACTUALLY HAD ANY THOUGHTS OF KILLING YOURSELF?: NO

## 2025-03-18 NOTE — ANESTHESIA PROCEDURE NOTES
Arterial Line:    Date/Time: 3/18/2025 8:19 AM    Staffing  Performed: attending   Authorized by: Humberto Edwards MD    Performed by: Lencho Stewart MD    An arterial line was placed. Procedure performed using surface landmarks.in the OR for the following indication(s): continuous blood pressure monitoring.    A 20 gauge (size), 12 cm (length), Angiocath (type) catheter was placed into the Right radial artery, secured by Tegaderm,   Seldinger technique not used.  Events:  patient tolerated procedure well with no complications.      Additional notes:  Endoaortic balloon occluder planned

## 2025-03-18 NOTE — ANESTHESIA PROCEDURE NOTES
Peripheral Block    Patient location during procedure: OR  Start time: 3/18/2025 1:54 PM  End time: 3/18/2025 1:54 PM  Reason for block: at surgeon's request and post-op pain management  Staffing  Performed: attending and fellow   Authorized by: Mike Stauffer DO    Performed by: Lencho Stewart MD  Preanesthetic Checklist  Completed: patient identified, IV checked, site marked, risks and benefits discussed, surgical consent, monitors and equipment checked, pre-op evaluation and timeout performed   Timeout performed at:   Peripheral Block  Patient position: laying flat  Prep: ChloraPrep  Patient monitoring: heart rate and continuous pulse ox  Block type: serratus anterior  Injection technique: catheter  Guidance: ultrasound guided  Local infiltration: ropivacaine  Infiltration strength: 0.5 %  Dose: 30 mL  Needle  Needle type: Tuohy   Needle gauge: 22 G  Needle length: 8 cm  Needle localization: ultrasound guidance     image stored in chart  Assessment  Injection assessment: negative aspiration for heme, no paresthesia on injection, incremental injection and local visualized surrounding nerve on ultrasound  Additional Notes  Serratus anterior plane nerve block:     Prior to procedure: Following a focused history, procedure-related and patient-specific complications were discussed. Risks, benefits, and alternatives were explained. Informed, written consent was provided by the patient and/or surrogate decision maker for the block. Anticoagulation (if any) was held per MOE guidelines. ASA monitors were applied. Patient was positioned, prepped with chlorhexidine, and draped with sterile towels.     Ultrasound guidance was used to visualize the needle throughout duration of the procedure.  Aspiration was negative. A total of 30 cc of 0.5% ropivacaine, was divided and injected unilaterally.  Catheters threaded into space and secured.  Patient tolerated procedure well.               Chest x-ray interpreted by ER Physician Dr. Moore  Findings: heart size normal, no infiltrates, lungs fully expanded, soft tissues appear normal.

## 2025-03-18 NOTE — ANESTHESIA PREPROCEDURE EVALUATION
"Patient: Rodney Mccabe    Procedure Information       Date/Time: 03/18/25 0650    Procedure: REPAIR, MITRAL VALVE, ROBOT-ASSISTED    Location: [facility] OR 18 / [facility] OR    Surgeons: Kimmie Ennis MD            Relevant Problems   Anesthesia   (-) Difficult intubation   (-) PONV (postoperative nausea and vomiting)      Cardiac   (+) Mitral regurgitation, acute   (+) Mitral valve insufficiency   (+) Murmur   (+) Severe mitral regurgitation   (-) Chest pain      Pulmonary   (-) Asthma   (-) Chronic obstructive pulmonary disease (Multi)   (-) JORY (obstructive sleep apnea)      Neuro   (+) Seizures (Multi) (Last seizure: 2001  Not on any anti-seizure meds)      Liver   (+) Chronic hepatitis C virus infection (Multi) (Treated)      Endocrine (within normal limits)      Hematology  Hx of B cell lymphoma s/p radiation therapy      ID   (+) Chronic hepatitis C virus infection (Multi) (Treated)      GYN (within normal limits)       Clinical information reviewed:   Tobacco  Allergies  Meds  Problems  Med Hx  Surg Hx   Fam Hx  Soc   Hx        NPO Detail:  NPO/Void Status  Date of Last Liquid: 03/17/25  Time of Last Liquid: 2200  Date of Last Solid: 03/17/25  Time of Last Solid: 2359  Last Intake Type: Clear fluids         Physical Exam    Airway  Mallampati: III  TM distance: >3 FB  Neck ROM: full     Cardiovascular   Rhythm: regular  Rate: normal  (-) peripheral edema     Dental - normal exam     Pulmonary    Abdominal   Abdomen: soft           Anesthesia Plan    History of general anesthesia?: yes  History of complications of general anesthesia?: no    ASA 3     general and regional   (Patient agreeable to truncal chest wall blocks.     Patient denies dysphagia but mentions that they had to use a \"smaller tube\" and thinks it was in relation to his prior ABBY. However, he mentions it was through the nasopharyngeal route. Pediatric ABBY probe immediately available. Patient counseled on risks of ABBY placement " including esophageal bleeding and injury.     Double lumen tube with Sanders for placement, art line, central line, ABBY and right side truncal chest wall block)  The patient is not a current smoker.  Patient was previously instructed to abstain from smoking on day of procedure.  Patient did not smoke on day of procedure.  Education provided regarding risk of obstructive sleep apnea.  intravenous induction   Postoperative administration of opioids is intended.  Anesthetic plan and risks discussed with patient.  Use of blood products discussed with patient who consented to blood products.    Plan discussed with fellow and attending.  Plan general endotracheal anesthesia with large bore peripheral IV access, arterial line placement, central venous access, ABBY, and ASA standard monitors.   Postoperative mechanical ventilation with ICU stay also detailed to the patient.  The possibility of blood product transfusion was also described in detail.  Risks, benefits, alternatives of this plan were described in detail to the patient, who indicated understanding and agreed to proceed.    Risks, benefits, and alternatives to peripheral nerve blockade for postoperative pain management were explained to the patient, who indicated understanding and agreed to proceed.    Humberto Edwards MD

## 2025-03-18 NOTE — ANESTHESIA PROCEDURE NOTES
Arterial Line:    Date/Time: 3/18/2025 7:40 AM    Staffing  Performed: fellow   Authorized by: Humberto Edwards MD    Performed by: Lencho Stewart MD    An arterial line was placed. Procedure performed using ultrasound guidance.in the OR for the following indication(s): continuous blood pressure monitoring, blood sampling needed and unable to use non-invasive cuff.    A 20 gauge (size), 12 cm (length), Arrow (type) catheter was placed into the Left brachial artery, secured by Tegaderm,   Seldinger technique used.  Events:  patient tolerated procedure well with no complications.
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
## 2025-03-18 NOTE — PROGRESS NOTES
65 y/o M with PMH of MVP, severe MR, non-obstructive CAD (Mild-moderate non-obstructive coronary atherosclerosis in a right dominant circulation).  Hep C (treated), B-cell lymphoma 2004 (R-[facility]) with recurrence in 2017 treated with radiation. He is now s/p Robotic assisted MVR using size 34 mm CG future annuloplasty band with Dr. Chavez.    Neuro: No PMH.  Wean propofol. NMB reversal. Tylenol and hydromorphone for pain control. PT/OT.   CV: PMH of MVP, severe MVR, non obstructive CAD s/p robotic assisted MVR using 34 mm CG future annuloplasty band with Dr. Chavez. Pre/post EF 50%. Wean Epi. Epicardial wires VVI@50. Start stain when able.   Pulm: Former smoker. Wean FIO2. Extubate when able. IS. F/U CXR.   GI: No PMH. NPO for now. Bowel regimen. PPI for GI prophylaxis.   : No PMH. Baseline Cr 0.8. Keep hamilton.   Endo: No PMH. A1c 4.5. ISS.   Heme: PMH of B cell Lymphoma s/p; radiation. SQH tomorrow. SCDs for DVT prophylaxis. ASA tomorrow. Keep T&S   ID: PMH of Hep C treated. MRSA negative. Periop cefazolin.     Lines:  Right IJ MAC w Minimac placed 3/18/25   Left brachial a-line placed 3/18/25     Anthony Frederick MD  PGY-5 Anesthesiology and Critical Care Medicine.

## 2025-03-18 NOTE — OP NOTE
Pre-operative Diagnosis:  Pre-Op Diagnosis Codes:      * Mitral valve insufficiency, unspecified etiology [I34.0]    Post-operative Diagnosis:  Post-op Diagnosis     * Mitral valve insufficiency, unspecified etiology [I34.0]     Surgeon:      * Kimmie Ennis - Primary     * Pawan Vaca - Assisting     Assistants:   Lizzie De Oliveira SA II     No appropriately qualified resident was available to assist with the procedure.    The OSCAR /  was scrubbed for the entire case and assisted by manipulating and retracting tissue as well as wound closure.    Anesthesia:    Procedure(s) and Anesthesia Type:     * ROBOTIC MITRAL VALVE REPAIR USING SIZE 34MM CG FUTURE ANNULOPLASTY BAND; RIGHT GROIN CUTDOWN UING INTRACLUDE BALLOON - General    Anesthesia Staff:  Anesthesiologist: Humberto Edwards MD; Mike Stauffer DO  Anesthesia Resident: Lencho Stewart MD  Perfusionist: Luz Kovacs    OPERATION/PROCEDURE:  1. Minimally invasive robotic assisted mitral valve repair (complex, 2 pairs of [place]-Alex chords and annuloplasty using a 34 mm CG Future anuloplasty band).   2. Exploration of the right femoral vein and artery for cannulation for cardiopulmonary bypass.      INDICATION:  64 Year old presenting with severe symptomatic mitral regurgitation.  The risks and benefits of surgery were discussed in detail.  The patient agrees to proceed on symptomatic and prognostic grounds.    FINDINGS:  On ABBY, the heart function was preserved.  The ABBY also confirmed the diagnosis of severe mitral regurgitation with posterior leaflet prolapse.     PROCEDURE IN DETAIL:  With the patient supine with the right side slightly elevated on the operating table, the skin was prepped and draped.  Small groin crease incision was made overlying the right femoral artery and vein.  These were cannulated using a 21-Vincentian arterial IntraClude and 25-Vincentian venous cannula.  These were performed using ABBY guidance. Right mini thoracotomy was  made.  The right lung was deflated and cardiopulmonary bypass was started after full anticoagulation. The DaVinci robot was docked and the pericardium was opened. The aorta was occluded using the IntraClude balloon occlusion device and DelNido cardioplegia was infused antegradely into the aortic root, resulting in prompt electromechanical arrest of the heart.     Left atriotomy was performed following dissection of Sondergaard groove.  Examination of the mitral valve revealed prolapse of the P2 segment of the posterior leaflet. This was repaired using 2 pairs of Heppner-Alex chords and 34 mm CG Future band was secured using 9 X 2-0 Ethibond sutures. Assessment with saline demonstrated excellent competence.  Left atriotomy was then closed using continuous 3-0 Prolene sutures.     Bipolar right ventricular pacing wires were applied.  Following de-airing with the aid of CO2, the aortic balloon occlusion device was deflated.  The heart resumed activity in sinus rhythm.  Cardiopulmonary bypass was weaned uneventfully and ABBY demonstrated excellent result with no residual mitral regurgitation.     Protamine was administered to reverse systemic anticoagulation and hemostasis was secured.  One Brian drain was inserted into the pleural cavity and the wound was closed using 1 figure-of-eight size 5 Ethibond pericostal suture and closure in layers using vicryl and subcuticular skin closure.     After decannulation, the femoral artery and vein were repaired using 5-0 Prolene sutures.  The groin wound was closed in layers using Vicryl and subcuticular skin closure.     The patient remained stable and was transferred to the Cardiothoracic Intensive Care Unit in a stable cardiorespiratory condition.  I was available for all aspects of the procedure preoperatively and postoperatively.    Kimmie Vaca MD PhD

## 2025-03-18 NOTE — ANESTHESIA PROCEDURE NOTES
Airway  Date/Time: 3/18/2025 7:54 AM  Urgency: elective    Airway not difficult    Staffing  Performed: fellow   Authorized by: Humberto Edwards MD    Performed by: Lencho Stewart MD  Patient location during procedure: OR    Indications and Patient Condition  Indications for airway management: anesthesia and airway protection  Spontaneous Ventilation: absent  Sedation level: deep  Preoxygenated: yes  Patient position: sniffing  Mask difficulty assessment: 2 - vent by mask + OA or adjuvant +/- NMBA  Planned trial extubation    Final Airway Details  Final airway type: endotracheal airway      Successful airway: ETT - double lumen left  Cuffed: yes   Successful intubation technique: video laryngoscopy  Facilitating devices/methods: intubating stylet  Endotracheal tube insertion site: oral  ETT DL size (fr): 39  Cormack-Lehane Classification: grade I - full view of glottis  Placement verified by: chest auscultation, bronchoscopy and capnometry   Placement verification comments: (VL)  Number of attempts at approach: 1  Ventilation between attempts: none

## 2025-03-18 NOTE — ANESTHESIA PROCEDURE NOTES
Peripheral IV  Date/Time: 3/18/2025 7:58 AM      Placement  Needle size: 18 G  Laterality: left  Location: hand  Local anesthetic: none  Site prep: alcohol  Technique: anatomical landmarks  Attempts: 1
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
## 2025-03-18 NOTE — ANESTHESIA PROCEDURE NOTES
Central Venous Line:    Date/Time: 3/18/2025 8:20 AM    A central venous line was placed in the OR for the following indication(s): central venous access and CVP monitoring.  Staffing  Performed: fellow   Authorized by: Humberto Edwards MD    Performed by: Lencho Stewart MD    Sterility preparation included the following: provider hand hygiene performed prior to central venous catheter insertion, all 5 sterile barriers used (gloves, gown, cap, mask, large sterile drape) during central venous catheter insertion, antiseptic used during central venous catheter insertion and skin prep agent completely dried prior to procedure.  Medical reason for not performing maximal sterile barrier technique: no  The patient was placed in Trendelenburg position.    Right internal jugular vein was prepped.    The site was prepped with Chlorhexidine.  Size: 9 Fr   Length: 12  Catheter type: introducer   Number of Lumens: double lumen    This catheter was not an oximetric catheter.    During the procedure, the following specific steps were taken: target vein identified, needle advanced into vein and blood aspirated and guidewire advanced into vein.  Seldinger technique not used.  Procedure performed using ultrasound guidance.  Sterile gel and probe cover used in ultrasound-guided central venous catheter insertion.    Intravenous verification was obtained by ultrasound, venous blood return and manometry.      Post insertion care included: all ports aspirated, all ports flushed easily, guidewire removed intact, Biopatch applied, line sutured in place and dressing applied.    During the procedure the patient experienced: patient tolerated procedure well with no complications.           images stored in chart

## 2025-03-18 NOTE — ANESTHESIA POSTPROCEDURE EVALUATION
Patient: Rodney Mccabe    Procedure Summary       Date: 03/18/25 Room / Location: [facility] OR 18 / [facility] [facility] [place]    Anesthesia Start: 0722 Anesthesia Stop: 1438    Procedure: ROBOTIC MITRAL VALVE REPAIR USING SIZE 34MM CG FUTURE ANNULOPLASTY BAND; RIGHT GROIN CUTDOWN UING INTRACLUDE BALLOON Diagnosis:       Mitral valve insufficiency, unspecified etiology      (Mitral valve insufficiency, unspecified etiology [I34.0])    Surgeons: Kimmie Ennis MD Responsible Provider: Mike Stauffer DO    Anesthesia Type: general, regional ASA Status: 3            Anesthesia Type: general, regional    Vitals Value Taken Time   /58 03/18/25 1438   Temp 36 C 03/18/25 1438   Pulse 76 03/18/25 1438   Resp 16 03/18/25 1438   SpO2 99 % 03/18/25 1438   Vitals shown include unfiled device data.    Anesthesia Post Evaluation    Patient location during evaluation: ICU  Patient participation: complete - patient cannot participate  Level of consciousness: sedated  Pain management: adequate  Airway patency: patent  Cardiovascular status: acceptable  Respiratory status: ETT and ventilator  Hydration status: acceptable  Postoperative Nausea and Vomiting: none        No notable events documented.

## 2025-03-18 NOTE — BRIEF OP NOTE
Date: 3/18/2025  OR Location: [facility] OR    Name: Rodney Mccabe, : 1960, Age: 64 y.o., MRN: [id number], Sex: male    Diagnosis  Pre-op Diagnosis      * Mitral valve insufficiency, unspecified etiology [I34.0] Post-op Diagnosis     * Mitral valve insufficiency, unspecified etiology [I34.0]     Procedures  ROBOTIC MITRAL VALVE REPAIR USING SIZE 34MM CG FUTURE ANNULOPLASTY BAND; RIGHT GROIN CUTDOWN UING INTRACLUDE BALLOON  72579 - [place] VLVP MITRAL VALVE W/CARD BYP W/PROSTC RING  .  Cut down right groin for direct right femoral artery & right femoral vein cannulation  Right mini thoracotomy  Mitral valve repair with 34mm CG Future Band & 2 gortex chordae    Chest Tubes/Drains: 1 right pleural       Temporary Pacing Wires: V wires    Permanent pacer/ICD: No    Sternotomy performed by: KHOI    Conduit Harvested by: KHOI    Sternal Wires placed by: KHOI    Closure/Cutdown performed by: right groin cutdown by Yennifer MI II & closure by Mervin ESCOBAR, R mini thoracotomy incision by Dr Ennis & closure by Yennifer MI II    Cardio Pulmonary Bypass Time: 161min  Cross-clamp Time: 125min  Circulatory Arrest: No Time:     Is patient candidate for Emergency Re-sternotomy? No   -If yes, POD #10 is - NA    Surgeons      * Kimmie Ennis - Primary    Resident/Fellow/Other Assistant:  Surgeons and Role:     * Pawan Vaca MD PhD - Assisting    Staff:   Circulator: Jourdan Xie Person: Jose M  Circulator: Ese  Circulator: Mindy Xie Person: Luna  Circulator: Bridgette  Surgical Assistant: Mickey  Surgical Assistant: Rock  Surgical Assistant: Sofiya  Surgical Assistant: Sav  Surgical Assistant: Deanna Mac Circulator: Rufina    Anesthesia Staff: Anesthesiologist: Humberto Edwards MD; iMke Stauffer DO  Anesthesia Resident: Lencho Stewart MD  Perfusionist: Luz Kovacs    Procedure Summary  Anesthesia: Anesthesia type not filed in the log.  ASA: III  Estimated Blood Loss: 250mL  Intra-op Medications:    Administrations occurring from 0650 to 1405 on 03/18/25:   Medication Name Total Dose   sodium chloride 0.9 % irrigation solution 2,000 mL   heparin (porcine) 25,000 Units in sodium chloride 0.9 % 250 mL irrigation 25,000 Units   calcium chloride 100 mg/mL syringe 1 g   ceFAZolin (Ancef) 1 g 4 g   dexAMETHasone (Decadron) 4 mg/mL 4 mg   dexmedeTOMIDine 4 mcg/mL in 100 mL NS infusion 150.08 mcg   electrolyte-A (Plasmalyte-A) Cannot be calculated   EPINEPHrine (Adrenalin) 4 mg in sodium chloride 0.9% 250 mL (16 mcg/mL) infusion (premix) 0.22 mg   fentaNYL (Sublimaze) injection 50 mcg/mL 750 mcg   glycopyrrolate 0.2 mg/mL 0.6 mg   heparin 1,000 units/mL 26,000 Units   lidocaine PF (cardiac) syringe 2% 50 mg   magnesium sulfate 50 % injection 2 g   mannitol 20 % IV 60 mL   methadone (Dolophine) injection 10 mg   midazolam PF (Versed) injection 1 mg/mL 1 mg   norepinephrine (Levophed) 8 mg in sodium chloride 0.9% 250 mL (0.032 mg/mL) infusion (premix) 0.06 mg   norepinephrine (Levophed) 16 mcg/mL syringe for Anesthesia 72 mcg   propofol (Diprivan) injection 10 mg/mL 207.96 mg   protamine 10 mg/mL 250 mg   rocuronium 10 mg/mL 90 mg   sodium bicarbonate injection 1 mEq/mL 25 mEq   NaCl 0.9 % infusion 226 mL   tranexamic acid (Cyklokapron) 5,000 mg in sodium chloride 0.9% 250 mL (20 mg/mL) infusion 2,076.66 mg   acetaminophen (Tylenol) tablet 650 mg Cannot be calculated   cholecalciferol (Vitamin D-3) tablet 1,000 Units Cannot be calculated   cyanocobalamin (Vitamin B-12) tablet 1,000 mcg Cannot be calculated   docusate sodium (Colace) capsule 100 mg Cannot be calculated   melatonin tablet 3 mg Cannot be calculated   polyethylene glycol (Glycolax, Miralax) packet 17 g Cannot be calculated              Anesthesia Record               Intraprocedure I/O Totals          Intake    Dexmedetomidine 0.00 mL    The total shown is the total volume documented since Anesthesia Start was filed.    Norepinephrine Drip 0.00 mL    The  total shown is the total volume documented since Anesthesia Start was filed.    Tranexamic Acid 0.00 mL    The total shown is the total volume documented since Anesthesia Start was filed.    Epinephrine Drip 0.00 mL    The total shown is the total volume documented since Anesthesia Start was filed.    Cell Saver 321 mL    Total Intake 321 mL       Output    Urine 1000 mL    Total Output 1000 mL       Net    Net Volume -679 mL          Specimen: No specimens collected               Findings: *    Complications:  None; patient tolerated the procedure well.     Disposition: ICU - intubated and hemodynamically stable.  Condition: stable  Specimens Collected: No specimens collected  Attending Attestation: I was present and scrubbed for the key portions of the procedure.    Kimmie Ennis  Phone Number: [phone]

## 2025-03-19 ENCOUNTER — APPOINTMENT (OUTPATIENT)
Dept: RADIOLOGY | Facility: HOSPITAL | Age: 65
DRG: 216 | End: 2025-03-19
Payer: OTHER GOVERNMENT

## 2025-03-19 LAB
ALBUMIN SERPL BCP-MCNC: 4.1 G/DL (ref 3.4–5)
ANION GAP SERPL CALC-SCNC: 13 MMOL/L (ref 10–20)
BLOOD EXPIRATION DATE: NORMAL
BUN SERPL-MCNC: 11 MG/DL (ref 6–23)
CA-I BLD-SCNC: 1.05 MMOL/L (ref 1.1–1.33)
CALCIUM SERPL-MCNC: 8.2 MG/DL (ref 8.6–10.6)
CHLORIDE SERPL-SCNC: 104 MMOL/L (ref 98–107)
CO2 SERPL-SCNC: 25 MMOL/L (ref 21–32)
CREAT SERPL-MCNC: 0.69 MG/DL (ref 0.5–1.3)
DISPENSE STATUS: NORMAL
EGFRCR SERPLBLD CKD-EPI 2021: >90 ML/MIN/1.73M*2
ERYTHROCYTE [DISTWIDTH] IN BLOOD BY AUTOMATED COUNT: 12.9 % (ref 11.5–14.5)
GLUCOSE BLD MANUAL STRIP-MCNC: 119 MG/DL (ref 74–99)
GLUCOSE BLD MANUAL STRIP-MCNC: 135 MG/DL (ref 74–99)
GLUCOSE BLD MANUAL STRIP-MCNC: 142 MG/DL (ref 74–99)
GLUCOSE BLD MANUAL STRIP-MCNC: 145 MG/DL (ref 74–99)
GLUCOSE BLD MANUAL STRIP-MCNC: 146 MG/DL (ref 74–99)
GLUCOSE BLD MANUAL STRIP-MCNC: 171 MG/DL (ref 74–99)
GLUCOSE SERPL-MCNC: 155 MG/DL (ref 74–99)
HCT VFR BLD AUTO: 37.8 % (ref 41–52)
HGB BLD-MCNC: 13 G/DL (ref 13.5–17.5)
MAGNESIUM SERPL-MCNC: 2.24 MG/DL (ref 1.6–2.4)
MCH RBC QN AUTO: 31.7 PG (ref 26–34)
MCHC RBC AUTO-ENTMCNC: 34.4 G/DL (ref 32–36)
MCV RBC AUTO: 92 FL (ref 80–100)
NRBC BLD-RTO: 0 /100 WBCS (ref 0–0)
PHOSPHATE SERPL-MCNC: 2.1 MG/DL (ref 2.5–4.9)
PLATELET # BLD AUTO: 102 X10*3/UL (ref 150–450)
POTASSIUM SERPL-SCNC: 3.7 MMOL/L (ref 3.5–5.3)
PRODUCT BLOOD TYPE: 5100
PRODUCT CODE: NORMAL
RBC # BLD AUTO: 4.1 X10*6/UL (ref 4.5–5.9)
SODIUM SERPL-SCNC: 138 MMOL/L (ref 136–145)
UNIT ABO: NORMAL
UNIT NUMBER: NORMAL
UNIT RH: NORMAL
UNIT VOLUME: 283
UNIT VOLUME: 283
UNIT VOLUME: 350
UNIT VOLUME: 350
WBC # BLD AUTO: 7.9 X10*3/UL (ref 4.4–11.3)
XM INTEP: NORMAL

## 2025-03-19 PROCEDURE — 83735 ASSAY OF MAGNESIUM: CPT

## 2025-03-19 PROCEDURE — 2500000005 HC RX 250 GENERAL PHARMACY W/O HCPCS

## 2025-03-19 PROCEDURE — 80069 RENAL FUNCTION PANEL: CPT

## 2025-03-19 PROCEDURE — 71045 X-RAY EXAM CHEST 1 VIEW: CPT

## 2025-03-19 PROCEDURE — 94640 AIRWAY INHALATION TREATMENT: CPT

## 2025-03-19 PROCEDURE — 71045 X-RAY EXAM CHEST 1 VIEW: CPT | Performed by: RADIOLOGY

## 2025-03-19 PROCEDURE — 2500000001 HC RX 250 WO HCPCS SELF ADMINISTERED DRUGS (ALT 637 FOR MEDICARE OP): Performed by: NURSE PRACTITIONER

## 2025-03-19 PROCEDURE — 97530 THERAPEUTIC ACTIVITIES: CPT | Mod: GP

## 2025-03-19 PROCEDURE — 2500000001 HC RX 250 WO HCPCS SELF ADMINISTERED DRUGS (ALT 637 FOR MEDICARE OP)

## 2025-03-19 PROCEDURE — 82947 ASSAY GLUCOSE BLOOD QUANT: CPT

## 2025-03-19 PROCEDURE — 2500000004 HC RX 250 GENERAL PHARMACY W/ HCPCS (ALT 636 FOR OP/ED)

## 2025-03-19 PROCEDURE — 97161 PT EVAL LOW COMPLEX 20 MIN: CPT | Mod: GP

## 2025-03-19 PROCEDURE — 85027 COMPLETE CBC AUTOMATED: CPT

## 2025-03-19 PROCEDURE — 99231 SBSQ HOSP IP/OBS SF/LOW 25: CPT

## 2025-03-19 PROCEDURE — 1200000002 HC GENERAL ROOM WITH TELEMETRY DAILY

## 2025-03-19 PROCEDURE — 82330 ASSAY OF CALCIUM: CPT

## 2025-03-19 PROCEDURE — 37799 UNLISTED PX VASCULAR SURGERY: CPT

## 2025-03-19 PROCEDURE — 99291 CRITICAL CARE FIRST HOUR: CPT

## 2025-03-19 RX ORDER — BISACODYL 10 MG/1
10 SUPPOSITORY RECTAL DAILY PRN
Status: DISCONTINUED | OUTPATIENT
Start: 2025-03-19 | End: 2025-03-23 | Stop reason: HOSPADM

## 2025-03-19 RX ORDER — HYDROMORPHONE HYDROCHLORIDE 0.2 MG/ML
0.2 INJECTION INTRAMUSCULAR; INTRAVENOUS; SUBCUTANEOUS EVERY 2 HOUR PRN
Status: DISCONTINUED | OUTPATIENT
Start: 2025-03-19 | End: 2025-03-19

## 2025-03-19 RX ORDER — INSULIN LISPRO 100 [IU]/ML
0-5 INJECTION, SOLUTION INTRAVENOUS; SUBCUTANEOUS
Status: DISCONTINUED | OUTPATIENT
Start: 2025-03-19 | End: 2025-03-19

## 2025-03-19 RX ORDER — IRON POLYSACCHARIDE COMPLEX 150 MG
150 CAPSULE ORAL DAILY
Status: DISCONTINUED | OUTPATIENT
Start: 2025-03-19 | End: 2025-03-20

## 2025-03-19 RX ORDER — HEPARIN SODIUM 5000 [USP'U]/ML
5000 INJECTION, SOLUTION INTRAVENOUS; SUBCUTANEOUS EVERY 8 HOURS
Status: DISCONTINUED | OUTPATIENT
Start: 2025-03-19 | End: 2025-03-23 | Stop reason: HOSPADM

## 2025-03-19 RX ORDER — CHOLECALCIFEROL (VITAMIN D3) 25 MCG
2000 TABLET ORAL DAILY
Status: DISCONTINUED | OUTPATIENT
Start: 2025-03-19 | End: 2025-03-23 | Stop reason: HOSPADM

## 2025-03-19 RX ORDER — METOPROLOL TARTRATE 25 MG/1
12.5 TABLET, FILM COATED ORAL 2 TIMES DAILY
Status: DISCONTINUED | OUTPATIENT
Start: 2025-03-19 | End: 2025-03-20

## 2025-03-19 RX ORDER — ASPIRIN 81 MG/1
81 TABLET ORAL DAILY
Status: DISCONTINUED | OUTPATIENT
Start: 2025-03-20 | End: 2025-03-23 | Stop reason: HOSPADM

## 2025-03-19 RX ORDER — MULTIVIT-MIN/IRON FUM/FOLIC AC 7.5 MG-4
1 TABLET ORAL DAILY
Status: DISCONTINUED | OUTPATIENT
Start: 2025-03-19 | End: 2025-03-19

## 2025-03-19 RX ORDER — LANOLIN ALCOHOL/MO/W.PET/CERES
1000 CREAM (GRAM) TOPICAL DAILY
Status: DISCONTINUED | OUTPATIENT
Start: 2025-03-19 | End: 2025-03-23 | Stop reason: HOSPADM

## 2025-03-19 RX ORDER — ACETAMINOPHEN 325 MG/1
975 TABLET ORAL EVERY 8 HOURS
Status: DISCONTINUED | OUTPATIENT
Start: 2025-03-19 | End: 2025-03-23 | Stop reason: HOSPADM

## 2025-03-19 RX ADMIN — SENNOSIDES AND DOCUSATE SODIUM 2 TABLET: 50; 8.6 TABLET ORAL at 21:19

## 2025-03-19 RX ADMIN — HEPARIN SODIUM 5000 UNITS: 5000 INJECTION, SOLUTION INTRAVENOUS; SUBCUTANEOUS at 08:58

## 2025-03-19 RX ADMIN — CEFAZOLIN SODIUM 2 G: 2 INJECTION, SOLUTION INTRAVENOUS at 21:16

## 2025-03-19 RX ADMIN — METOPROLOL TARTRATE 12.5 MG: 25 TABLET, FILM COATED ORAL at 21:17

## 2025-03-19 RX ADMIN — Medication 1 TABLET: at 13:26

## 2025-03-19 RX ADMIN — PANTOPRAZOLE SODIUM 40 MG: 40 TABLET, DELAYED RELEASE ORAL at 06:10

## 2025-03-19 RX ADMIN — POTASSIUM PHOSPHATE, MONOBASIC 1000 MG: 500 TABLET, SOLUBLE ORAL at 13:26

## 2025-03-19 RX ADMIN — CEFAZOLIN SODIUM 2 G: 2 INJECTION, SOLUTION INTRAVENOUS at 13:26

## 2025-03-19 RX ADMIN — SENNOSIDES AND DOCUSATE SODIUM 2 TABLET: 50; 8.6 TABLET ORAL at 08:58

## 2025-03-19 RX ADMIN — ASPIRIN 81 MG: 81 TABLET, CHEWABLE ORAL at 08:59

## 2025-03-19 RX ADMIN — ACETAMINOPHEN 975 MG: 325 TABLET, FILM COATED ORAL at 21:18

## 2025-03-19 RX ADMIN — Medication 2 L/MIN: at 13:26

## 2025-03-19 RX ADMIN — POLYSACCHARIDE-IRON COMPLEX 150 MG: 150 CAPSULE ORAL at 13:26

## 2025-03-19 RX ADMIN — CEFAZOLIN SODIUM 2 G: 2 INJECTION, SOLUTION INTRAVENOUS at 05:18

## 2025-03-19 RX ADMIN — POLYETHYLENE GLYCOL 3350 17 G: 17 POWDER, FOR SOLUTION ORAL at 08:58

## 2025-03-19 RX ADMIN — POTASSIUM CHLORIDE 40 MEQ: 1.5 POWDER, FOR SOLUTION ORAL at 05:17

## 2025-03-19 RX ADMIN — HEPARIN SODIUM 5000 UNITS: 5000 INJECTION, SOLUTION INTRAVENOUS; SUBCUTANEOUS at 17:07

## 2025-03-19 RX ADMIN — Medication 1 L/MIN: at 08:00

## 2025-03-19 RX ADMIN — METOPROLOL TARTRATE 12.5 MG: 25 TABLET, FILM COATED ORAL at 09:44

## 2025-03-19 ASSESSMENT — COGNITIVE AND FUNCTIONAL STATUS - GENERAL
MOBILITY SCORE: 18
MOVING FROM LYING ON BACK TO SITTING ON SIDE OF FLAT BED WITH BEDRAILS: A LITTLE
CLIMB 3 TO 5 STEPS WITH RAILING: A LITTLE
WALKING IN HOSPITAL ROOM: A LITTLE
WALKING IN HOSPITAL ROOM: A LITTLE
STANDING UP FROM CHAIR USING ARMS: A LITTLE
DAILY ACTIVITIY SCORE: 24
MOBILITY SCORE: 22
MOVING TO AND FROM BED TO CHAIR: A LITTLE
TURNING FROM BACK TO SIDE WHILE IN FLAT BAD: A LITTLE
CLIMB 3 TO 5 STEPS WITH RAILING: A LITTLE

## 2025-03-19 ASSESSMENT — PAIN - FUNCTIONAL ASSESSMENT
PAIN_FUNCTIONAL_ASSESSMENT: 0-10

## 2025-03-19 ASSESSMENT — PAIN SCALES - GENERAL
PAINLEVEL_OUTOF10: 0 - NO PAIN
PAINLEVEL_OUTOF10: 3
PAINLEVEL_OUTOF10: 0 - NO PAIN
PAINLEVEL_OUTOF10: 4
PAINLEVEL_OUTOF10: 0 - NO PAIN
PAINLEVEL_OUTOF10: 2

## 2025-03-19 ASSESSMENT — ACTIVITIES OF DAILY LIVING (ADL)
ADL_ASSISTANCE: INDEPENDENT
ADLS_ADDRESSED: NO

## 2025-03-19 NOTE — SIGNIFICANT EVENT
right pleural chest tube removed without difficulty. Patient tolerated well.    Stat 1V CXR ordered.    Skye Hart PA-C  Cardiac Surgery [facility]  [facility]  Team Pager [phone]

## 2025-03-19 NOTE — PROGRESS NOTES
Physical Therapy    Physical Therapy Evaluation & Treatment    Patient Name: Rodney Mccabe  MRN: [id number]  Department: [facility] [place] [facility]  Room: 15/15-A  Today's Date: 3/19/2025   Time Calculation  Start Time: 0842  Stop Time: 0920  Time Calculation (min): 38 min    Assessment/Plan   PT Assessment  PT Assessment Results: Decreased strength, Decreased endurance, Decreased mobility  Rehab Prognosis: Excellent  Barriers to Discharge Home: No anticipated barriers  Evaluation/Treatment Tolerance: Patient tolerated treatment well  Medical Staff Made Aware: Yes  Strengths: Ability to acquire knowledge, Living arrangement secure, Premorbid level of function, Support and attitude of living partners  End of Session Communication: Bedside nurse  Assessment Comment: Pt performed static sitting edge of chair, static standing with BUE support on FWW, sit<>stand transfers with CGA, and ambulated ~500 feet with FWW and CGA for safety. Pt was able to tolerate entire session without complaints of SOB or fatigue. VS remained stable throughout session. Pt will benefit from continued skilled therapy to address remaining impairments and return to [facility] for safe disharge home.  End of Session Patient Position: Up in chair, Alarm off, not on at start of session   IP OR SWING BED PT PLAN  Inpatient or Swing Bed: Inpatient  PT Plan  Treatment/Interventions: Transfer training, Bed mobility, Gait training, Stair training, Balance training, Neuromuscular re-education, Strengthening, Endurance training, Range of motion, Therapeutic exercise, Therapeutic activity  PT Plan: Ongoing PT  PT Frequency: 3 times per week  PT Discharge Recommendations: Low intensity level of continued care  PT Recommended Transfer Status: Assist x1, Contact guard  PT - OK to Discharge: Yes    Subjective     General Visit Information:  General  Reason for Referral: s/p Robotic assisted MVR using size 34 mm CG future annuloplasty band 3/18.  Referred By: Dr. Ennis  Past Medical  History Relevant to Rehab: MVP, severe MR, CAD, hep c (treated), B-cell lymphoma (2004/2017) s/p radiation, back surgery, former smoker  Family/Caregiver Present: No  Prior to Session Communication: Bedside nurse  Patient Position Received: Up in chair, Alarm off, not on at start of session  Preferred Learning Style: auditory, kinesthetic, verbal  General Comment: Pt alert, pleasant, and agreeable to PT session.  Home Living:  Home Living  Type of Home: House  Lives With: Significant other (Girlfriend Rowena)  Home Adaptive Equipment: None  Home Layout: Two level  Home Access: Stairs to enter without rails  Entrance Stairs-Rails: None  Entrance Stairs-Number of Steps: 1 KONG  Bathroom Shower/Tub: Tub/shower unit  Bathroom Toilet: Standard  Bathroom Equipment: Bedside commode  Home Living Comments: Pt's girlfriend will be home with him for a few weeks to assist after discharge.  Prior Level of Function:  Prior Function Per Pt/Caregiver Report  Level of [place]: Independent with ADLs and functional transfers, Independent with homemaking with ambulation  ADL Assistance: Independent  Homemaking Assistance: Independent  Ambulatory Assistance: Independent  Vocational: Retired (Retired from [employer])  Prior Function Comments: (-) falls, (+) drives  Precautions:  Precautions  Hearing/Visual Limitations: WFLs  Medical Precautions: Cardiac precautions, Fall precautions, Chest tube  Precautions Comment: VVI@50, MAP>65, SpO2>92%     03/19/25 0842 03/19/25 0920   Vital Signs   Vitals Session Pre PT Post PT   Heart Rate 79 79   Heart Rate Source Monitor Monitor   Resp 18 19   SpO2 100 % 100 %   /78 108/82   MAP (mmHg) 94 90   BP Location Right arm Right arm   BP Method Automatic Automatic   Patient Position Sitting Sitting   Vital Signs Comment VS remained stable throughout session.  --        Objective   Pain:  Pain Assessment  Pain Assessment: 0-10  0-10 (Numeric) Pain Score: 4  Pain Type: Surgical pain  Pain Location:  Chest  Pain Interventions: Medication (See MAR)  Cognition:  Cognition  Overall Cognitive Status: Within Functional Limits  Orientation Level: Oriented X4  Attention: Within Functional Limits    General Assessments:  General Observation  General Observation: IV, CTx1, tele, pain catheter     Activity Tolerance  Endurance: Tolerates 10 - 20 min exercise with multiple rests  Early Mobility/Exercise Safety Screen: Proceed with mobilization - No exclusion criteria met  Activity Tolerance Comments: Pt tolerates standing, ambulation, and transfers without complaints of SOB or fatigue.    Sensation  Light Touch: No apparent deficits    Strength  Strength Comments: BLE at least 3/5 evidenced by functional mobility.  Coordination  Movements are Fluid and Coordinated: Yes    Postural Control  Postural Control: Within Functional Limits    Static Sitting Balance  Static Sitting-Balance Support: Bilateral upper extremity supported, No upper extremity supported  Static Sitting-Level of Assistance: Close supervision  Static Sitting-Comment/Number of Minutes: 8 minutes edge of chair for line/tube management.    Static Standing Balance  Static Standing-Balance Support: Bilateral upper extremity supported  Static Standing-Level of Assistance: Close supervision  Static Standing-Comment/Number of Minutes: 5 minutes with BUE support on FWW during line/tube management.  Functional Assessments:  ADL  ADL's Addressed: No    Bed Mobility  Bed Mobility: No    Transfers  Transfer: Yes  Transfer 1  Transfer From 1: Sit to, Stand to  Transfer to 1: Sit, Stand  Technique 1: Sit to stand, Stand to sit  Transfer Device 1: Walker  Transfer Level of Assistance 1: Contact guard  Trials/Comments 1: Verbal cues for hand placement, CGA for line/tube management.    Ambulation/Gait Training  Ambulation/Gait Training Performed: Yes  Ambulation/Gait Training 1  Surface 1: Level tile  Device 1: Rolling walker  Assistance 1: Contact guard  Quality of Gait 1:  Inconsistent stride length  Comments/Distance (ft) 1: x500 feet with FWW and CGA for safety. Pt ambulates at very slow angelic. No standing rest breaks required, VS remained stable throughout.    Stairs  Stairs: No  Extremity/Trunk Assessments:  RUE   RUE : Within Functional Limits  LUE   LUE: Within Functional Limits  RLE   RLE : Within Functional Limits  LLE   LLE : Within Functional Limits  Treatments:  Therapeutic Activity  Therapeutic Activity Performed: Yes  Therapeutic Activity 1: Increased time required for skilled line/tube management.  Therapeutic Activity 2: Static sitting performed edge of chair x 8 min for line/tube management.  Therapeutic Activity 3: Static standing performed with BUE support on FWW x 5 min for line/tube management.  Therapeutic Activity 4: Incentive spirometer x 10 @ 1500.  Outcome Measures:  [facility] Basic Mobility  Turning from your back to your side while in a flat bed without using bedrails: A little  Moving from lying on your back to sitting on the side of a flat bed without using bedrails: A little  Moving to and from bed to chair (including a wheelchair): A little  Standing up from a chair using your arms (e.g. wheelchair or bedside chair): A little  To walk in hospital room: A little  Climbing 3-5 steps with railing: A little  Basic Mobility - Total Score: 18    FSS-ICU  Ambulation: Walks >/ or equal to 150 feet with supervision  Rolling: Supervision or set-up only  Sitting: Supervision or set-up only  Transfer Sit-to-Stand: Supervision or set-up only  Transfer Supine-to-Sit: Supervision or set-up only  Total Score: 25    Early Mobility/Exercise Safety Screen: Proceed with mobilization - No exclusion criteria met  ICU Mobility Scale: Walking with assistance of 1 person [8]    Encounter Problems       Encounter Problems (Active)       Balance       Patient will demonstrate ability to score at least 24/28 on the Tinetti balance assessment tool to ensure safety upon discharge.   (Progressing)       Start:  03/19/25    Expected End:  04/02/25               Mobility       Pt will demonstrate ability to ambulate >/=1000 feet with LRAD and no balance deficits to facilitate safe discharge home.  (Progressing)       Start:  03/19/25    Expected End:  04/02/25            Pt will demonstrate ability to ascend/descend 12 stairs with 1 HR and no balance deficits for safe discharge home.  (Progressing)       Start:  03/19/25    Expected End:  04/02/25               PT Transfers       Pt will demonstrate ability to perform 5X STS from chair without UE assist in < 15 seconds with stable VS for improved functional mobility.   (Progressing)       Start:  03/19/25    Expected End:  04/02/25               Pain - Adult              Education Documentation  Precautions, taught by JASEN Horton at 3/19/2025  9:43 AM.  Learner: Patient  Readiness: Acceptance  Method: Explanation  Response: Verbalizes Understanding, Demonstrated Understanding    Body Mechanics, taught by JASEN Horton at 3/19/2025  9:43 AM.  Learner: Patient  Readiness: Acceptance  Method: Explanation  Response: Verbalizes Understanding, Demonstrated Understanding    Mobility Training, taught by JASEN Horton at 3/19/2025  9:43 AM.  Learner: Patient  Readiness: Acceptance  Method: Explanation  Response: Verbalizes Understanding, Demonstrated Understanding    Education Comments  No comments found.

## 2025-03-19 NOTE — PROGRESS NOTES
Met with patient and his SO and introduced myself as Care Coordinator and member of the discharge planning team.  Pt is s/p Robotic Mitral Valve Repair. He plans to return home at time of discharge. Care Coordinator will continue to follow for home going needs.  Jacqui Jim RN

## 2025-03-19 NOTE — PROGRESS NOTES
"CTICU Progress Note  Rodney Mccabe/[id number]    Admit Date: 3/12/2025  Hospital Length of Stay: 7   ICU Length of Stay: 17h   CT SURGEON:     SUBJECTIVE:   No major events overnight. Central line, [place] and [place] removed. Patient reports mild chest pain with the chest tube when moving or deep breathing.    MEDICATIONS  Infusions:  lactated Ringer's, Last Rate: 5 mL/hr (03/18/25 1800)  ropivacaine (PF) in NS cmpd, Last Rate: 10 mL/hr (03/18/25 1516)      Scheduled:  acetaminophen, 975 mg, q6h  aspirin, 81 mg, Daily  ceFAZolin, 2 g, q8h  insulin lispro, 0-5 Units, TID AC  oxygen, , Continuous - Inhalation  polyethylene glycol, 17 g, Daily  potassium phosphate (monobasic), 1,000 mg, 4x daily  sennosides-docusate sodium, 2 tablet, BID      PRN:  alteplase, 2 mg, PRN  calcium gluconate, 1 g, q6h PRN  calcium gluconate, 2 g, q6h PRN  dextrose, 25 g, q15 min PRN   Or  glucagon, 1 mg, q15 min PRN  HYDROmorphone, 0.2 mg, q2h PRN  magnesium sulfate, 2 g, q6h PRN  magnesium sulfate, 4 g, q6h PRN  naloxone, 0.2 mg, q5 min PRN  oxyCODONE, 10 mg, q4h PRN  oxyCODONE, 5 mg, q4h PRN  potassium chloride CR, 20 mEq, q6h PRN   Or  potassium chloride, 20 mEq, q6h PRN  potassium chloride CR, 40 mEq, q6h PRN   Or  potassium chloride, 40 mEq, q6h PRN  potassium chloride, 20 mEq, q6h PRN  potassium chloride, 40 mEq, q6h PRN        PHYSICAL EXAM:   Visit Vitals  /78   Pulse 74   Temp 37.3 °C (99.1 °F) (Temporal)   Resp 14   Ht 1.753 m (5' 9\")   Wt 67.4 kg (148 lb 9.6 oz)   SpO2 96%   BMI 21.94 kg/m²   Smoking Status Former   BSA 1.81 m²     Wt Readings from Last 5 Encounters:   03/18/25 67.4 kg (148 lb 9.6 oz)     INTAKE/OUTPUT:  I/O last 3 completed shifts:  In: 4046.8 (60 mL/kg) [P.O.:260; I.V.:199.5 (3 mL/kg); Blood:321; IV Piggyback:1633.3]  Out: 5245 (77.8 mL/kg) [Urine:4965 (2 mL/kg/hr); Chest Tube:280]  Weight: 67.4 kg        Physical Exam:   - CONSTITUTION: Awake, sitting in chair, WDWN  - NEUROLOGIC: A&Ox3, DIAZ, no focal " deficits  - CARDIOVASCULAR: RRR, no murmurs  - RESPIRATORY: CTA b/l, CT right side with serosanguinous output  - GI: Soft, nontender, nondistended  - : normal genitalia  - EXTREMITIES: No edema  - SKIN: Surgical incisions intact  - PSYCHIATRIC: Calm, cooperative    Daily Risk Screen  N/A    Images: reviewed    Invasive Hemodynamics:    Most Recent Range Past 24hrs   BP (Art) 93/42 Arterial Line BP 1  Min: 88/51  Max: 137/66   MAP(Art) 60 mmHg Arterial Line MAP 1 (mmHg)   Min: 60 mmHg  Max: 93 mmHg   RA/CVP   No data recorded   PA   No data recorded   PA(mean)   No data recorded   CO   No data recorded   CI   No data recorded   Mixed Venous   No data recorded   SVR    No data recorded         Assessment/Plan   Assessment:  Rodney Mccabe is a 65 y/o male with PMH severe Mitral Regurgitation, MVP, non-obstructive CAD, treated Hep C, B Cell lymphoma 2004 and recurrence in 2017 treated with radiation, former smoker who is s/p Robotic assisted MVRepair using size 34 mm CG future annuloplasty ban with Dr. Ennis 3/18.      Plan:  NEURO:  No major PMH. Acute post operative pain.   -->  - Serial neuro and pain assessments   - Scheduled Tylenol   - PRN oxycodone  - PT Consult, OOB to chair as tolerated, chair position if not tolerated   - CAM ICU score qshift  - Sleep/wake cycle hygiene     CV:  PMH of MVP, nonobstructive CAD Is now status post Robotic assisted MVR using size 34 mm CG future annuloplasty ban  Pre/Post EF: 50% Arrived to CTICU on 03/18 A/V epicardial wires set VVi @ 60.-->  - Maintain goal MAP above 65  - Mixed venous and CI Q4H  - Volume resuscitate as clinically indicated  - Maintain epicardial wires set VVI @ 45   - Start aspirin and metoprolol 12.5 mg bid     PULM:  PMH of former smoker.  Chest tubes pleural r.-->  - Daily CXR while with chest tubes  - Goal SpO2 >92%.   - IS q1h and OOB to chair when extubated  - Chest tubes to wall suction. Possible removal later today.     GI:  no major PMH. -->  - Diet  regular  - Colace/senna BID and miralax BID     :  CSA-ALLY Risk Score low.  No history of renal disease, baseline creatinine 0.8. Creatinine stable post-op. -->  - Straight cath if unable to void  - Goal UOP 0.5ml/kg/hr  - RFP as clinically indicated  - Replete electrolytes per CTICU protocol     ENDO:  no major PMH. A1c: 4.5-->  - Maintain BG <180, insulin per CTICU protocol  - SSI with meals     HEME: PMH of B cell Lymphoma s/p radiation. Acute blood loss anemia and thrombocytopenia.-->    - Monitor drain output volume and characteristics  - CBC, coags, and fibrinogen post op and as clinically indicated  - ASA daily  - SQH & SCDs for DVT prophylaxis.  - Oncology cleared for surgery, recommended follow up in 6 months.  - Last type and screen: 3/19     ID: PMH of Hep C (treated). Afebrile, no current indications of infection. MRSA negative.-->  - Trend temp q4h  - Periop cefazolin x 48hrs     Skin:  No active skin issues.  - preventative Mepilex dressings in place on sacrum and heels  - change preventative Mepilex weekly or more frequently as indicated (when moist/soiled)   - every shift skin assessment per nursing and weekly ICU skin rounds  - moisture barrier to be applied with danni care  - active skin problems addressed with nursing on daily rounds     Proph:  SCDs  SQH     G:   PIVs      Restraints: Not indicated    Code status: Full Code  Emergency contact: Extended Emergency Contact Information  Primary Emergency Contact: Rowena Ruiz  Address: [address] [place] [address], [place] [address] [place]  Work Phone: [phone]  Mobile Phone: [phone]  Relation: Significant Other         A,B,C,D,E,F,G: reviewed     Dispo: transfer to floor   CTICU TEAM PHONE [phone]

## 2025-03-19 NOTE — PROGRESS NOTES
[facility] called [phone] to notify them of this [facility] covered patient. They asked that clinicals be sent to  [phone]. A  will be assigned and call this author back.      Kimberly Newman (LSW, MSW)

## 2025-03-19 NOTE — PROGRESS NOTES
03/19/25 1529   Discharge Planning   Type of Residence Private residence   Home or Post Acute Services In home services   Type of Home Care Services Home nursing visits   Expected Discharge Disposition Home   ([facility])     [facility] [phone] ex [phone]..  Fax number [phone].  Requesting additional clinicals prior to discharge.  Progress Note, PT, OT, labs, mar and home care orders along with the AVS.

## 2025-03-19 NOTE — CARE PLAN
Problem: Skin  Goal: Decreased wound size/increased tissue granulation at next dressing change  Outcome: Progressing  Flowsheets (Taken 3/19/2025 0656)  Decreased wound size/increased tissue granulation at next dressing change:   Promote sleep for wound healing   Utilize specialty bed per algorithm  Goal: Participates in plan/prevention/treatment measures  Outcome: Progressing  Flowsheets (Taken 3/19/2025 0656)  Participates in plan/prevention/treatment measures:   Discuss with provider PT/OT consult   Increase activity/out of bed for meals  Goal: Prevent/manage excess moisture  Outcome: Progressing  Flowsheets (Taken 3/19/2025 0656)  Prevent/manage excess moisture:   Cleanse incontinence/protect with barrier cream   Follow provider orders for dressing changes  Goal: Prevent/minimize sheer/friction injuries  Outcome: Progressing  Flowsheets (Taken 3/19/2025 0656)  Prevent/minimize sheer/friction injuries:   Complete micro-shifts as needed if patient unable. Adjust patient position to relieve pressure points, not a full turn   Turn/reposition every 2 hours/use positioning/transfer devices  Goal: Promote/optimize nutrition  Outcome: Progressing  Flowsheets (Taken 3/19/2025 0656)  Promote/optimize nutrition:   Assist with feeding   Discuss with provider if NPO > 2 days  Goal: Promote skin healing  Outcome: Progressing  Flowsheets (Taken 3/19/2025 0656)  Promote skin healing:   Turn/reposition every 2 hours/use positioning/transfer devices   Assess skin/pad under line(s)/device(s)

## 2025-03-19 NOTE — CARE PLAN
Problem: Pain - Adult  Goal: Verbalizes/displays adequate comfort level or baseline comfort level  Outcome: Progressing     Problem: Safety - Adult  Goal: Free from fall injury  Outcome: Progressing     Problem: Discharge Planning  Goal: Discharge to home or other facility with appropriate resources  Outcome: Progressing     Problem: Chronic Conditions and Co-morbidities  Goal: Patient's chronic conditions and co-morbidity symptoms are monitored and maintained or improved  Outcome: Progressing     Problem: Nutrition  Goal: Nutrient intake appropriate for maintaining nutritional needs  Outcome: Progressing     Problem: Fall/Injury  Goal: Not fall by end of shift  Outcome: Progressing  Goal: Be free from injury by end of the shift  Outcome: Progressing  Goal: Verbalize understanding of personal risk factors for fall in the hospital  Outcome: Progressing  Goal: Verbalize understanding of risk factor reduction measures to prevent injury from fall in the home  Outcome: Progressing  Goal: Use assistive devices by end of the shift  Outcome: Progressing  Goal: Pace activities to prevent fatigue by end of the shift  Outcome: Progressing     Problem: Skin  Goal: Decreased wound size/increased tissue granulation at next dressing change  Outcome: Progressing  Flowsheets (Taken 3/19/2025 0931)  Decreased wound size/increased tissue granulation at next dressing change:   Promote sleep for wound healing   Protective dressings over bony prominences  Goal: Participates in plan/prevention/treatment measures  Outcome: Progressing  Flowsheets (Taken 3/19/2025 0931)  Participates in plan/prevention/treatment measures:   Elevate heels   Discuss with provider PT/OT consult  Goal: Prevent/manage excess moisture  Outcome: Progressing  Goal: Prevent/minimize sheer/friction injuries  Outcome: Progressing  Flowsheets (Taken 3/19/2025 0931)  Prevent/minimize sheer/friction injuries:   Use pull sheet   HOB 30 degrees or less   Increase  activity/out of bed for meals  Goal: Promote/optimize nutrition  Outcome: Progressing  Goal: Promote skin healing  Outcome: Progressing  Flowsheets (Taken 3/19/2025 1946)  Promote skin healing:   Protective dressings over bony prominences   Assess skin/pad under line(s)/device(s)   Rotate device position/do not position patient on device

## 2025-03-19 NOTE — PROGRESS NOTES
Postop Pain HPI -   Palliative: relieved with IV analgesics and regional local anesthetics  Provocative: movement  Quality:  burning and aching  Radiation:  none  Severity:  5/10  Timing: constant    24-HOUR OPIOID CONSUMPTION:  None     Scheduled medications  acetaminophen, 975 mg, oral, q6h  aspirin, 81 mg, oral, Daily  ceFAZolin, 2 g, intravenous, q8h  heparin (porcine), 5,000 Units, subcutaneous, q8h  insulin lispro, 0-5 Units, subcutaneous, TID AC  oxygen, , inhalation, Continuous - Inhalation  polyethylene glycol, 17 g, oral, Daily  potassium phosphate (monobasic), 1,000 mg, oral, 4x daily  sennosides-docusate sodium, 2 tablet, oral, BID      Continuous medications  lactated Ringer's, 5 mL/hr, Last Rate: 5 mL/hr (03/18/25 1800)  ropivacaine (PF) in NS cmpd, 10 mL/hr, Last Rate: 10 mL/hr (03/18/25 1516)      PRN medications  PRN medications: alteplase, calcium gluconate, calcium gluconate, dextrose **OR** glucagon, magnesium sulfate, magnesium sulfate, naloxone, oxyCODONE, oxyCODONE, potassium chloride CR **OR** potassium chloride, potassium chloride CR **OR** potassium chloride, potassium chloride, potassium chloride     Physical Exam:  Constitutional:  no distress, alert and cooperative  Eyes: clear sclera  Head/Neck: No apparent injury, trachea midline  Respiratory/Thorax: Patent airways, thorax symmetric, breathing comfortably  Cardiovascular: no pitting edema  Gastrointestinal: Nondistended  Musculoskeletal: ROM intact  Extremities: no clubbing  Neurological: alert, carrera x4  Psychological: Appropriate affect    Results for orders placed or performed during the hospital encounter of 03/12/25 (from the past 24 hours)   Blood Gas Arterial Full Panel Unsolicited   Result Value Ref Range    POCT pH, Arterial 7.36 (L) 7.38 - 7.42 pH    POCT pCO2, Arterial 45 (H) 38 - 42 mm Hg    POCT pO2, Arterial 84 (L) 85 - 95 mm Hg    POCT SO2, Arterial 97 94 - 100 %    POCT Oxy Hemoglobin, Arterial 95.9 94.0 - 98.0 %    POCT  Hematocrit Calculated, Arterial 42.0 41.0 - 52.0 %    POCT Sodium, Arterial 136 136 - 145 mmol/L    POCT Potassium, Arterial 4.2 3.5 - 5.3 mmol/L    POCT Chloride, Arterial 106 98 - 107 mmol/L    POCT Ionized Calcium, Arterial 1.11 1.10 - 1.33 mmol/L    POCT Glucose, Arterial 104 (H) 74 - 99 mg/dL    POCT Lactate, Arterial 0.7 0.4 - 2.0 mmol/L    POCT Base Excess, Arterial -0.4 -2.0 - 3.0 mmol/L    POCT HCO3 Calculated, Arterial 25.4 22.0 - 26.0 mmol/L    POCT Hemoglobin, Arterial 14.1 13.5 - 17.5 g/dL    POCT Anion Gap, Arterial 9 (L) 10 - 25 mmo/L    Patient Temperature 37.0 degrees Celsius    FiO2 100 %   Coox Panel, Arterial Unsolicited   Result Value Ref Range    POCT Hemoglobin, Arterial 14.1 13.5 - 17.5 g/dL    POCT Oxy Hemoglobin, Arterial 95.9 94.0 - 98.0 %    POCT Carboxyhemoglobin, Arterial 0.5 %    POCT Methemoglobin, Arterial 1.0 0.0 - 1.5 %    POCT Deoxy Hemoglobin, Arterial 2.6 0.0 - 5.0 %   Blood Gas Arterial Full Panel Unsolicited   Result Value Ref Range    POCT pH, Arterial 7.38 7.38 - 7.42 pH    POCT pCO2, Arterial 42 38 - 42 mm Hg    POCT pO2, Arterial 420 (H) 85 - 95 mm Hg    POCT SO2, Arterial 100 94 - 100 %    POCT Oxy Hemoglobin, Arterial 98.5 (H) 94.0 - 98.0 %    POCT Hematocrit Calculated, Arterial 33.0 (L) 41.0 - 52.0 %    POCT Sodium, Arterial 133 (L) 136 - 145 mmol/L    POCT Potassium, Arterial 5.2 3.5 - 5.3 mmol/L    POCT Chloride, Arterial 107 98 - 107 mmol/L    POCT Ionized Calcium, Arterial 0.95 (L) 1.10 - 1.33 mmol/L    POCT Glucose, Arterial 114 (H) 74 - 99 mg/dL    POCT Lactate, Arterial 0.8 0.4 - 2.0 mmol/L    POCT Base Excess, Arterial -0.4 -2.0 - 3.0 mmol/L    POCT HCO3 Calculated, Arterial 24.8 22.0 - 26.0 mmol/L    POCT Hemoglobin, Arterial 11.0 (L) 13.5 - 17.5 g/dL    POCT Anion Gap, Arterial 6 (L) 10 - 25 mmo/L    Patient Temperature 37.0 degrees Celsius    FiO2 80 %   Coox Panel, Arterial Unsolicited   Result Value Ref Range    POCT Hemoglobin, Arterial 11.0 (L) 13.5 -  17.5 g/dL    POCT Oxy Hemoglobin, Arterial 98.5 (H) 94.0 - 98.0 %    POCT Carboxyhemoglobin, Arterial 0.6 %    POCT Methemoglobin, Arterial 0.5 0.0 - 1.5 %    POCT Deoxy Hemoglobin, Arterial 0.4 0.0 - 5.0 %   Blood Gas Venous Full Panel Unsolicited   Result Value Ref Range    POCT pH, Venous 7.34 7.33 - 7.43 pH    POCT pCO2, Venous 49 41 - 51 mm Hg    POCT pO2, Venous 58 (H) 35 - 45 mm Hg    POCT SO2, Venous 92 (H) 45 - 75 %    POCT Oxy Hemoglobin, Venous 90.4 (H) 45.0 - 75.0 %    POCT Hematocrit Calculated, Venous 33.0 (L) 41.0 - 52.0 %    POCT Sodium, Venous 135 (L) 136 - 145 mmol/L    POCT Potassium, Venous 5.3 3.5 - 5.3 mmol/L    POCT Chloride, Venous 105 98 - 107 mmol/L    POCT Ionized Calicum, Venous 0.97 (L) 1.10 - 1.33 mmol/L    POCT Glucose, Venous 118 (H) 74 - 99 mg/dL    POCT Lactate, Venous 0.8 0.4 - 2.0 mmol/L    POCT Base Excess, Venous 0.2 -2.0 - 3.0 mmol/L    POCT HCO3 Calculated, Venous 26.4 (H) 22.0 - 26.0 mmol/L    POCT Hemoglobin, Venous 11.1 (L) 13.5 - 17.5 g/dL    POCT Anion Gap, Venous 9.0 (L) 10.0 - 25.0 mmol/L    Patient Temperature 37.0 degrees Celsius    FiO2 80 %   Coox Panel, Venous Unsolicited   Result Value Ref Range    POCT Carboxyhemoglobin, Venous 0.9 %    POCT Methemoglobin, Venous 0.6 0.0 - 1.5 %   Blood Gas Arterial Full Panel Unsolicited   Result Value Ref Range    POCT pH, Arterial 7.36 (L) 7.38 - 7.42 pH    POCT pCO2, Arterial 46 (H) 38 - 42 mm Hg    POCT pO2, Arterial 434 (H) 85 - 95 mm Hg    POCT SO2, Arterial 99 94 - 100 %    POCT Oxy Hemoglobin, Arterial 98.2 (H) 94.0 - 98.0 %    POCT Hematocrit Calculated, Arterial 36.0 (L) 41.0 - 52.0 %    POCT Sodium, Arterial 135 (L) 136 - 145 mmol/L    POCT Potassium, Arterial 4.8 3.5 - 5.3 mmol/L    POCT Chloride, Arterial 106 98 - 107 mmol/L    POCT Ionized Calcium, Arterial 1.00 (L) 1.10 - 1.33 mmol/L    POCT Glucose, Arterial 128 (H) 74 - 99 mg/dL    POCT Lactate, Arterial 0.8 0.4 - 2.0 mmol/L    POCT Base Excess, Arterial 0.2  -2.0 - 3.0 mmol/L    POCT HCO3 Calculated, Arterial 26.0 22.0 - 26.0 mmol/L    POCT Hemoglobin, Arterial 12.0 (L) 13.5 - 17.5 g/dL    POCT Anion Gap, Arterial 8 (L) 10 - 25 mmo/L    Patient Temperature 37.0 degrees Celsius    FiO2 80 %   Coox Panel, Arterial Unsolicited   Result Value Ref Range    POCT Hemoglobin, Arterial 12.0 (L) 13.5 - 17.5 g/dL    POCT Oxy Hemoglobin, Arterial 98.2 (H) 94.0 - 98.0 %    POCT Carboxyhemoglobin, Arterial 0.4 %    POCT Methemoglobin, Arterial 0.6 0.0 - 1.5 %    POCT Deoxy Hemoglobin, Arterial 0.8 0.0 - 5.0 %   Blood Gas Arterial Full Panel Unsolicited   Result Value Ref Range    POCT pH, Arterial 7.35 (L) 7.38 - 7.42 pH    POCT pCO2, Arterial 45 (H) 38 - 42 mm Hg    POCT pO2, Arterial 430 (H) 85 - 95 mm Hg    POCT SO2, Arterial 99 94 - 100 %    POCT Oxy Hemoglobin, Arterial 97.8 94.0 - 98.0 %    POCT Hematocrit Calculated, Arterial 35.0 (L) 41.0 - 52.0 %    POCT Sodium, Arterial 135 (L) 136 - 145 mmol/L    POCT Potassium, Arterial 4.9 3.5 - 5.3 mmol/L    POCT Chloride, Arterial 107 98 - 107 mmol/L    POCT Ionized Calcium, Arterial 0.98 (L) 1.10 - 1.33 mmol/L    POCT Glucose, Arterial 133 (H) 74 - 99 mg/dL    POCT Lactate, Arterial 0.8 0.4 - 2.0 mmol/L    POCT Base Excess, Arterial -1.0 -2.0 - 3.0 mmol/L    POCT HCO3 Calculated, Arterial 24.8 22.0 - 26.0 mmol/L    POCT Hemoglobin, Arterial 11.7 (L) 13.5 - 17.5 g/dL    POCT Anion Gap, Arterial 8 (L) 10 - 25 mmo/L    Patient Temperature 37.0 degrees Celsius    FiO2 80 %   Coox Panel, Arterial Unsolicited   Result Value Ref Range    POCT Hemoglobin, Arterial 11.7 (L) 13.5 - 17.5 g/dL    POCT Oxy Hemoglobin, Arterial 97.8 94.0 - 98.0 %    POCT Carboxyhemoglobin, Arterial 0.6 %    POCT Methemoglobin, Arterial 1.0 0.0 - 1.5 %    POCT Deoxy Hemoglobin, Arterial 0.7 0.0 - 5.0 %   Blood Gas Arterial Full Panel Unsolicited   Result Value Ref Range    POCT pH, Arterial 7.36 (L) 7.38 - 7.42 pH    POCT pCO2, Arterial 45 (H) 38 - 42 mm Hg    POCT  pO2, Arterial 349 (H) 85 - 95 mm Hg    POCT SO2, Arterial 99 94 - 100 %    POCT Oxy Hemoglobin, Arterial 97.8 94.0 - 98.0 %    POCT Hematocrit Calculated, Arterial 34.0 (L) 41.0 - 52.0 %    POCT Sodium, Arterial 135 (L) 136 - 145 mmol/L    POCT Potassium, Arterial 4.8 3.5 - 5.3 mmol/L    POCT Chloride, Arterial 107 98 - 107 mmol/L    POCT Ionized Calcium, Arterial 0.94 (L) 1.10 - 1.33 mmol/L    POCT Glucose, Arterial 128 (H) 74 - 99 mg/dL    POCT Lactate, Arterial 1.2 0.4 - 2.0 mmol/L    POCT Base Excess, Arterial -0.3 -2.0 - 3.0 mmol/L    POCT HCO3 Calculated, Arterial 25.4 22.0 - 26.0 mmol/L    POCT Hemoglobin, Arterial 11.4 (L) 13.5 - 17.5 g/dL    POCT Anion Gap, Arterial 7 (L) 10 - 25 mmo/L    Patient Temperature 37.0 degrees Celsius    FiO2 80 %   Coox Panel, Arterial Unsolicited   Result Value Ref Range    POCT Hemoglobin, Arterial 11.4 (L) 13.5 - 17.5 g/dL    POCT Oxy Hemoglobin, Arterial 97.8 94.0 - 98.0 %    POCT Carboxyhemoglobin, Arterial 0.5 %    POCT Methemoglobin, Arterial 0.8 0.0 - 1.5 %    POCT Deoxy Hemoglobin, Arterial 0.9 0.0 - 5.0 %   Blood Gas Arterial Full Panel Unsolicited   Result Value Ref Range    POCT pH, Arterial 7.31 (L) 7.38 - 7.42 pH    POCT pCO2, Arterial 50 (H) 38 - 42 mm Hg    POCT pO2, Arterial 212 (H) 85 - 95 mm Hg    POCT SO2, Arterial 99 94 - 100 %    POCT Oxy Hemoglobin, Arterial 97.8 94.0 - 98.0 %    POCT Hematocrit Calculated, Arterial 38.0 (L) 41.0 - 52.0 %    POCT Sodium, Arterial 136 136 - 145 mmol/L    POCT Potassium, Arterial 4.2 3.5 - 5.3 mmol/L    POCT Chloride, Arterial 106 98 - 107 mmol/L    POCT Ionized Calcium, Arterial 1.23 1.10 - 1.33 mmol/L    POCT Glucose, Arterial 131 (H) 74 - 99 mg/dL    POCT Lactate, Arterial 1.2 0.4 - 2.0 mmol/L    POCT Base Excess, Arterial -1.6 -2.0 - 3.0 mmol/L    POCT HCO3 Calculated, Arterial 25.2 22.0 - 26.0 mmol/L    POCT Hemoglobin, Arterial 12.7 (L) 13.5 - 17.5 g/dL    POCT Anion Gap, Arterial 9 (L) 10 - 25 mmo/L    Patient  Temperature 37.0 degrees Celsius    FiO2 100 %   Coox Panel, Arterial Unsolicited   Result Value Ref Range    POCT Hemoglobin, Arterial 12.7 (L) 13.5 - 17.5 g/dL    POCT Oxy Hemoglobin, Arterial 97.8 94.0 - 98.0 %    POCT Carboxyhemoglobin, Arterial 0.6 %    POCT Methemoglobin, Arterial 0.6 0.0 - 1.5 %    POCT Deoxy Hemoglobin, Arterial 0.9 0.0 - 5.0 %   Magnesium   Result Value Ref Range    Magnesium 3.11 (H) 1.60 - 2.40 mg/dL   Coagulation Screen   Result Value Ref Range    Protime 13.3 (H) 9.8 - 12.4 seconds    INR 1.2 (H) 0.9 - 1.1    aPTT 30 26 - 36 seconds   Fibrinogen   Result Value Ref Range    Fibrinogen 175 (L) 200 - 400 mg/dL   CBC   Result Value Ref Range    WBC 13.1 (H) 4.4 - 11.3 x10*3/uL    nRBC 0.0 0.0 - 0.0 /100 WBCs    RBC 4.35 (L) 4.50 - 5.90 x10*6/uL    Hemoglobin 13.8 13.5 - 17.5 g/dL    Hematocrit 41.0 41.0 - 52.0 %    MCV 94 80 - 100 fL    MCH 31.7 26.0 - 34.0 pg    MCHC 33.7 32.0 - 36.0 g/dL    RDW 13.1 11.5 - 14.5 %    Platelets 144 (L) 150 - 450 x10*3/uL   Renal Function Panel   Result Value Ref Range    Glucose 192 (H) 74 - 99 mg/dL    Sodium 141 136 - 145 mmol/L    Potassium 3.7 3.5 - 5.3 mmol/L    Chloride 106 98 - 107 mmol/L    Bicarbonate 23 21 - 32 mmol/L    Anion Gap 16 10 - 20 mmol/L    Urea Nitrogen 10 6 - 23 mg/dL    Creatinine 0.84 0.50 - 1.30 mg/dL    eGFR >90 >60 mL/min/1.73m*2    Calcium 7.9 (L) 8.6 - 10.6 mg/dL    Phosphorus 2.2 (L) 2.5 - 4.9 mg/dL    Albumin 3.3 (L) 3.4 - 5.0 g/dL   Blood Gas Arterial Full Panel   Result Value Ref Range    POCT pH, Arterial 7.39 7.38 - 7.42 pH    POCT pCO2, Arterial 36 (L) 38 - 42 mm Hg    POCT pO2, Arterial 199 (H) 85 - 95 mm Hg    POCT SO2, Arterial 100 94 - 100 %    POCT Oxy Hemoglobin, Arterial 97.5 94.0 - 98.0 %    POCT Hematocrit Calculated, Arterial 44.0 41.0 - 52.0 %    POCT Sodium, Arterial 135 (L) 136 - 145 mmol/L    POCT Potassium, Arterial 3.7 3.5 - 5.3 mmol/L    POCT Chloride, Arterial 105 98 - 107 mmol/L    POCT Ionized  Calcium, Arterial 0.99 (L) 1.10 - 1.33 mmol/L    POCT Glucose, Arterial 196 (H) 74 - 99 mg/dL    POCT Lactate, Arterial 2.5 (H) 0.4 - 2.0 mmol/L    POCT Base Excess, Arterial -2.6 (L) -2.0 - 3.0 mmol/L    POCT HCO3 Calculated, Arterial 21.8 (L) 22.0 - 26.0 mmol/L    POCT Hemoglobin, Arterial 14.6 13.5 - 17.5 g/dL    POCT Anion Gap, Arterial 12 10 - 25 mmo/L    Patient Temperature 37.0 degrees Celsius    FiO2 50 %   ECG 12 Lead   Result Value Ref Range    Ventricular Rate 75 BPM    Atrial Rate 75 BPM    MN Interval 184 ms    QRS Duration 80 ms    QT Interval 446 ms    QTC Calculation(Bazett) 498 ms    P Axis 51 degrees    R Axis 77 degrees    T Axis 53 degrees    QRS Count 12 beats    Q Onset 219 ms    P Onset 127 ms    P Offset 174 ms    T Offset 442 ms    QTC Fredericia 480 ms   Blood Gas Arterial Full Panel   Result Value Ref Range    POCT pH, Arterial 7.36 (L) 7.38 - 7.42 pH    POCT pCO2, Arterial 41 38 - 42 mm Hg    POCT pO2, Arterial 140 (H) 85 - 95 mm Hg    POCT SO2, Arterial 99 94 - 100 %    POCT Oxy Hemoglobin, Arterial 97.0 94.0 - 98.0 %    POCT Hematocrit Calculated, Arterial 41.0 41.0 - 52.0 %    POCT Sodium, Arterial 135 (L) 136 - 145 mmol/L    POCT Potassium, Arterial 4.1 3.5 - 5.3 mmol/L    POCT Chloride, Arterial 104 98 - 107 mmol/L    POCT Ionized Calcium, Arterial 1.06 (L) 1.10 - 1.33 mmol/L    POCT Glucose, Arterial 180 (H) 74 - 99 mg/dL    POCT Lactate, Arterial 2.0 0.4 - 2.0 mmol/L    POCT Base Excess, Arterial -2.2 (L) -2.0 - 3.0 mmol/L    POCT HCO3 Calculated, Arterial 23.2 22.0 - 26.0 mmol/L    POCT Hemoglobin, Arterial 13.6 13.5 - 17.5 g/dL    POCT Anion Gap, Arterial 12 10 - 25 mmo/L    Patient Temperature 37.0 degrees Celsius    FiO2 40 %   POCT GLUCOSE   Result Value Ref Range    POCT Glucose 171 (H) 74 - 99 mg/dL   Calcium, Ionized   Result Value Ref Range    POCT Calcium, Ionized 1.05 (L) 1.1 - 1.33 mmol/L   Magnesium   Result Value Ref Range    Magnesium 2.24 1.60 - 2.40 mg/dL   CBC    Result Value Ref Range    WBC 7.9 4.4 - 11.3 x10*3/uL    nRBC 0.0 0.0 - 0.0 /100 WBCs    RBC 4.10 (L) 4.50 - 5.90 x10*6/uL    Hemoglobin 13.0 (L) 13.5 - 17.5 g/dL    Hematocrit 37.8 (L) 41.0 - 52.0 %    MCV 92 80 - 100 fL    MCH 31.7 26.0 - 34.0 pg    MCHC 34.4 32.0 - 36.0 g/dL    RDW 12.9 11.5 - 14.5 %    Platelets 102 (L) 150 - 450 x10*3/uL   Renal Function Panel   Result Value Ref Range    Glucose 155 (H) 74 - 99 mg/dL    Sodium 138 136 - 145 mmol/L    Potassium 3.7 3.5 - 5.3 mmol/L    Chloride 104 98 - 107 mmol/L    Bicarbonate 25 21 - 32 mmol/L    Anion Gap 13 10 - 20 mmol/L    Urea Nitrogen 11 6 - 23 mg/dL    Creatinine 0.69 0.50 - 1.30 mg/dL    eGFR >90 >60 mL/min/1.73m*2    Calcium 8.2 (L) 8.6 - 10.6 mg/dL    Phosphorus 2.1 (L) 2.5 - 4.9 mg/dL    Albumin 4.1 3.4 - 5.0 g/dL   POCT GLUCOSE   Result Value Ref Range    POCT Glucose 145 (H) 74 - 99 mg/dL   POCT GLUCOSE   Result Value Ref Range    POCT Glucose 146 (H) 74 - 99 mg/dL   POCT GLUCOSE   Result Value Ref Range    POCT Glucose 142 (H) 74 - 99 mg/dL       Rodney Mccabe is a 64 y.o. year old male patient who presents for ROBOTIC MITRAL VALVE REPAIR USING SIZE 34MM CG FUTURE ANNULOPLASTY BAND; RIGHT GROIN CUTDOWN UING INTRACLUDE BALLOON with Kimmie Ennis MD on 03/18/2025. Acute Pain consulted for block for postoperative pain control.      Plan:     - Right SAP block with catheter performed intraoperatively on 3/18/2025  - Ambit ball with Ropivacaine 0.2%/NaCl 0.9% 500mL, Rate 10 cc/hr bilaterally  - Ambit medication will not interfere with pain medication prescribed by the primary team.   - Please be aware of local anesthetic toxic dose and absorption variability before considering lidocaine patches  - Acute pain service will follow while catheter in place  - Rest of pain management per primary team     Acute Pain Resident  pg [phone] Consults  Acute Pain Service

## 2025-03-20 ENCOUNTER — APPOINTMENT (OUTPATIENT)
Dept: CARDIOLOGY | Facility: HOSPITAL | Age: 65
DRG: 216 | End: 2025-03-20
Payer: OTHER GOVERNMENT

## 2025-03-20 ENCOUNTER — APPOINTMENT (OUTPATIENT)
Dept: RADIOLOGY | Facility: HOSPITAL | Age: 65
DRG: 216 | End: 2025-03-20
Payer: OTHER GOVERNMENT

## 2025-03-20 LAB
ALBUMIN SERPL BCP-MCNC: 3.4 G/DL (ref 3.4–5)
ANION GAP SERPL CALC-SCNC: 9 MMOL/L (ref 10–20)
AORTIC VALVE PEAK VELOCITY: 1.22 M/S
AV PEAK GRADIENT: 6 MMHG
AVA (PEAK VEL): 3.64 CM2
BUN SERPL-MCNC: 15 MG/DL (ref 6–23)
CALCIUM SERPL-MCNC: 8.4 MG/DL (ref 8.6–10.6)
CHLORIDE SERPL-SCNC: 105 MMOL/L (ref 98–107)
CO2 SERPL-SCNC: 28 MMOL/L (ref 21–32)
CREAT SERPL-MCNC: 0.86 MG/DL (ref 0.5–1.3)
EGFRCR SERPLBLD CKD-EPI 2021: >90 ML/MIN/1.73M*2
EJECTION FRACTION APICAL 4 CHAMBER: 58.7
EJECTION FRACTION: 63 %
ERYTHROCYTE [DISTWIDTH] IN BLOOD BY AUTOMATED COUNT: 13.4 % (ref 11.5–14.5)
GLUCOSE SERPL-MCNC: 91 MG/DL (ref 74–99)
HCT VFR BLD AUTO: 35.4 % (ref 41–52)
HGB BLD-MCNC: 12 G/DL (ref 13.5–17.5)
LEFT ATRIUM VOLUME AREA LENGTH INDEX BSA: 41.3 ML/M2
LEFT VENTRICLE INTERNAL DIMENSION DIASTOLE: 4.86 CM (ref 3.5–6)
LEFT VENTRICULAR OUTFLOW TRACT DIAMETER: 2.39 CM
MAGNESIUM SERPL-MCNC: 1.88 MG/DL (ref 1.6–2.4)
MCH RBC QN AUTO: 32.1 PG (ref 26–34)
MCHC RBC AUTO-ENTMCNC: 33.9 G/DL (ref 32–36)
MCV RBC AUTO: 95 FL (ref 80–100)
MITRAL VALVE E/A RATIO: 1.01
NRBC BLD-RTO: 0 /100 WBCS (ref 0–0)
PHOSPHATE SERPL-MCNC: 1.7 MG/DL (ref 2.5–4.9)
PLATELET # BLD AUTO: 98 X10*3/UL (ref 150–450)
POTASSIUM SERPL-SCNC: 3.7 MMOL/L (ref 3.5–5.3)
RBC # BLD AUTO: 3.74 X10*6/UL (ref 4.5–5.9)
RIGHT VENTRICLE PEAK SYSTOLIC PRESSURE: 27.8 MMHG
SODIUM SERPL-SCNC: 138 MMOL/L (ref 136–145)
WBC # BLD AUTO: 8.1 X10*3/UL (ref 4.4–11.3)

## 2025-03-20 PROCEDURE — 93306 TTE W/DOPPLER COMPLETE: CPT

## 2025-03-20 PROCEDURE — 2500000004 HC RX 250 GENERAL PHARMACY W/ HCPCS (ALT 636 FOR OP/ED)

## 2025-03-20 PROCEDURE — 2500000001 HC RX 250 WO HCPCS SELF ADMINISTERED DRUGS (ALT 637 FOR MEDICARE OP)

## 2025-03-20 PROCEDURE — 99231 SBSQ HOSP IP/OBS SF/LOW 25: CPT

## 2025-03-20 PROCEDURE — 85027 COMPLETE CBC AUTOMATED: CPT

## 2025-03-20 PROCEDURE — 2500000001 HC RX 250 WO HCPCS SELF ADMINISTERED DRUGS (ALT 637 FOR MEDICARE OP): Performed by: NURSE PRACTITIONER

## 2025-03-20 PROCEDURE — 83735 ASSAY OF MAGNESIUM: CPT

## 2025-03-20 PROCEDURE — 71045 X-RAY EXAM CHEST 1 VIEW: CPT | Performed by: RADIOLOGY

## 2025-03-20 PROCEDURE — 97165 OT EVAL LOW COMPLEX 30 MIN: CPT | Mod: GO

## 2025-03-20 PROCEDURE — 80069 RENAL FUNCTION PANEL: CPT

## 2025-03-20 PROCEDURE — 71045 X-RAY EXAM CHEST 1 VIEW: CPT

## 2025-03-20 PROCEDURE — 1200000002 HC GENERAL ROOM WITH TELEMETRY DAILY

## 2025-03-20 PROCEDURE — 93306 TTE W/DOPPLER COMPLETE: CPT | Performed by: INTERNAL MEDICINE

## 2025-03-20 PROCEDURE — B246ZZZ ULTRASONOGRAPHY OF RIGHT AND LEFT HEART: ICD-10-PCS | Performed by: INTERNAL MEDICINE

## 2025-03-20 PROCEDURE — 94640 AIRWAY INHALATION TREATMENT: CPT

## 2025-03-20 PROCEDURE — 36415 COLL VENOUS BLD VENIPUNCTURE: CPT

## 2025-03-20 PROCEDURE — 99232 SBSQ HOSP IP/OBS MODERATE 35: CPT

## 2025-03-20 PROCEDURE — 2500000005 HC RX 250 GENERAL PHARMACY W/O HCPCS

## 2025-03-20 RX ORDER — METOPROLOL TARTRATE 1 MG/ML
5 INJECTION, SOLUTION INTRAVENOUS ONCE
Status: DISCONTINUED | OUTPATIENT
Start: 2025-03-20 | End: 2025-03-20

## 2025-03-20 RX ORDER — METOPROLOL TARTRATE 25 MG/1
37.5 TABLET, FILM COATED ORAL 2 TIMES DAILY
Status: DISCONTINUED | OUTPATIENT
Start: 2025-03-20 | End: 2025-03-21

## 2025-03-20 RX ORDER — METOPROLOL TARTRATE 25 MG/1
12.5 TABLET, FILM COATED ORAL ONCE
Status: COMPLETED | OUTPATIENT
Start: 2025-03-20 | End: 2025-03-20

## 2025-03-20 RX ORDER — METOPROLOL TARTRATE 25 MG/1
25 TABLET, FILM COATED ORAL 2 TIMES DAILY
Status: DISCONTINUED | OUTPATIENT
Start: 2025-03-20 | End: 2025-03-20

## 2025-03-20 RX ORDER — SODIUM,POTASSIUM PHOSPHATES 280-250MG
1 POWDER IN PACKET (EA) ORAL 4 TIMES DAILY
Status: COMPLETED | OUTPATIENT
Start: 2025-03-20 | End: 2025-03-20

## 2025-03-20 RX ADMIN — POTASSIUM & SODIUM PHOSPHATES POWDER PACK 280-160-250 MG 1 PACKET: 280-160-250 PACK at 14:10

## 2025-03-20 RX ADMIN — HEPARIN SODIUM 5000 UNITS: 5000 INJECTION, SOLUTION INTRAVENOUS; SUBCUTANEOUS at 08:15

## 2025-03-20 RX ADMIN — METOPROLOL TARTRATE 37.5 MG: 25 TABLET, FILM COATED ORAL at 21:07

## 2025-03-20 RX ADMIN — Medication 2 L/MIN: at 08:15

## 2025-03-20 RX ADMIN — POTASSIUM & SODIUM PHOSPHATES POWDER PACK 280-160-250 MG 1 PACKET: 280-160-250 PACK at 12:10

## 2025-03-20 RX ADMIN — HEPARIN SODIUM 5000 UNITS: 5000 INJECTION, SOLUTION INTRAVENOUS; SUBCUTANEOUS at 16:08

## 2025-03-20 RX ADMIN — SENNOSIDES AND DOCUSATE SODIUM 2 TABLET: 50; 8.6 TABLET ORAL at 21:07

## 2025-03-20 RX ADMIN — AMIODARONE HYDROCHLORIDE 150 MG: 1.5 INJECTION, SOLUTION INTRAVENOUS at 15:54

## 2025-03-20 RX ADMIN — HEPARIN SODIUM 5000 UNITS: 5000 INJECTION, SOLUTION INTRAVENOUS; SUBCUTANEOUS at 01:26

## 2025-03-20 RX ADMIN — CYANOCOBALAMIN TAB 1000 MCG 1000 MCG: 1000 TAB at 08:14

## 2025-03-20 RX ADMIN — CEFAZOLIN SODIUM 2 G: 2 INJECTION, SOLUTION INTRAVENOUS at 05:57

## 2025-03-20 RX ADMIN — SENNOSIDES AND DOCUSATE SODIUM 2 TABLET: 50; 8.6 TABLET ORAL at 08:14

## 2025-03-20 RX ADMIN — ACETAMINOPHEN 975 MG: 325 TABLET, FILM COATED ORAL at 14:10

## 2025-03-20 RX ADMIN — METOPROLOL TARTRATE 12.5 MG: 25 TABLET, FILM COATED ORAL at 15:56

## 2025-03-20 RX ADMIN — ASPIRIN 81 MG: 81 TABLET, COATED ORAL at 08:14

## 2025-03-20 RX ADMIN — POLYSACCHARIDE-IRON COMPLEX 150 MG: 150 CAPSULE ORAL at 08:14

## 2025-03-20 RX ADMIN — METOPROLOL TARTRATE 25 MG: 25 TABLET, FILM COATED ORAL at 08:15

## 2025-03-20 RX ADMIN — CHOLECALCIFEROL TAB 25 MCG (1000 UNIT) 2000 UNITS: 25 TAB at 08:14

## 2025-03-20 RX ADMIN — ACETAMINOPHEN 975 MG: 325 TABLET, FILM COATED ORAL at 05:57

## 2025-03-20 RX ADMIN — ACETAMINOPHEN 975 MG: 325 TABLET, FILM COATED ORAL at 21:07

## 2025-03-20 ASSESSMENT — COGNITIVE AND FUNCTIONAL STATUS - GENERAL
MOBILITY SCORE: 22
DAILY ACTIVITIY SCORE: 24
CLIMB 3 TO 5 STEPS WITH RAILING: A LITTLE
WALKING IN HOSPITAL ROOM: A LITTLE
DAILY ACTIVITIY SCORE: 24

## 2025-03-20 ASSESSMENT — PAIN SCALES - GENERAL
PAINLEVEL_OUTOF10: 0 - NO PAIN
PAINLEVEL_OUTOF10: 4
PAINLEVEL_OUTOF10: 0 - NO PAIN

## 2025-03-20 ASSESSMENT — PAIN - FUNCTIONAL ASSESSMENT
PAIN_FUNCTIONAL_ASSESSMENT: 0-10

## 2025-03-20 ASSESSMENT — ACTIVITIES OF DAILY LIVING (ADL): ADL_ASSISTANCE: INDEPENDENT

## 2025-03-20 NOTE — PROGRESS NOTES
Postop Pain HPI -   Palliative: relieved with IV analgesics and regional local anesthetics  Provocative: movement  Quality:  burning and aching  Radiation:  none  Severity: 1-2 /10  Timing: constant    24-HOUR OPIOID CONSUMPTION:  None     Scheduled medications  acetaminophen, 975 mg, oral, q8h  aspirin, 81 mg, oral, Daily  ceFAZolin, 2 g, intravenous, q8h  cholecalciferol, 2,000 Units, oral, Daily  cyanocobalamin, 1,000 mcg, oral, Daily  heparin (porcine), 5,000 Units, subcutaneous, q8h  iron polysaccharides, 150 mg, oral, Daily  metoprolol tartrate, 12.5 mg, oral, BID  oxygen, , inhalation, Continuous - 02/gases  polyethylene glycol, 17 g, oral, Daily  sennosides-docusate sodium, 2 tablet, oral, BID      Continuous medications  ropivacaine (PF) in NS cmpd, 10 mL/hr, Last Rate: 10 mL/hr (03/18/25 1516)      PRN medications  PRN medications: bisacodyl, naloxone, oxyCODONE, oxyCODONE     Physical Exam:  Constitutional:  no distress, alert and cooperative  Eyes: clear sclera  Head/Neck: No apparent injury, trachea midline  Respiratory/Thorax: Patent airways, thorax symmetric, breathing comfortably  Cardiovascular: no pitting edema  Gastrointestinal: Nondistended  Musculoskeletal: ROM intact  Extremities: no clubbing  Neurological: alert, carrera x4  Psychological: Appropriate affect    Results for orders placed or performed during the hospital encounter of 03/12/25 (from the past 24 hours)   POCT GLUCOSE   Result Value Ref Range    POCT Glucose 142 (H) 74 - 99 mg/dL   POCT GLUCOSE   Result Value Ref Range    POCT Glucose 135 (H) 74 - 99 mg/dL   POCT GLUCOSE   Result Value Ref Range    POCT Glucose 119 (H) 74 - 99 mg/dL       Rodney Mccabe is a 64 y.o. year old male patient who presents for ROBOTIC MITRAL VALVE REPAIR USING SIZE 34MM CG FUTURE ANNULOPLASTY BAND; RIGHT GROIN CUTDOWN UING INTRACLUDE BALLOON with Kimmie Ennis MD on 03/18/2025. Acute Pain consulted for block for postoperative pain control.      Plan:     -  Right SAP block with catheter performed intraoperatively on 3/18/2025  - b/l catheters removed today with intact tips   - Rest of pain management per primary team  - APS with sign off      Acute Pain Resident  pg [phone] Consults  Acute Pain Service

## 2025-03-20 NOTE — PROGRESS NOTES
"CARDIAC SURGERY DAILY PROGRESS NOTE    Rodney Mccabe is a 65 y/o male with PMH severe Mitral Regurgitation, MVP, non-obstructive CAD, treated Hep C, B Cell lymphoma 2004 and recurrence in 2017 treated with radiation, former smoker. Patient admitted on 3/12 in preparation for cardiac surgery. At time of admission patient is asymptomatic. No chest pain, shortness of breath. Plan for MV procedure with Dr. Ennis on 3/18.     OPERATION/PROCEDURE:   ROBOTIC MITRAL VALVE REPAIR USING SIZE 34MM CG FUTURE ANNULOPLASTY BAND; RIGHT GROIN CUTDOWN UING INTRACLUDE BALLOON  04287 - [place] VLVP MITRAL VALVE W/CARD BYP W/PROSTC RING  .  Cut down right groin for direct right femoral artery & right femoral vein cannulation  Right mini thoracotomy  Mitral valve repair with 34mm CG Future Band & 2 gortex chordae with Kimmie Ennis MD  On 3/18/25    CTICU Course: Uneventful  Transferred to T3 on 3/19/25      Interval History:   Pain catheters removed this morning    SUBJECTIVE:  Feels well. Walking the unit. Pain well controlled.     Objective   /77 (BP Location: Right arm, Patient Position: Lying)   Pulse 69   Temp 36.8 °C (98.2 °F) (Temporal)   Resp 18   Ht 1.753 m (5' 9\")   Wt 69.4 kg (152 lb 14.4 oz)   SpO2 96%   BMI 22.58 kg/m²   0-10 (Numeric) Pain Score: 0 - No pain   3 Day Weight Change: 0.499 kg (1 lb 1.6 oz) per day    Intake and Output    Intake/Output Summary (Last 24 hours) at 3/20/2025 1231  Last data filed at 3/20/2025 1200  Gross per 24 hour   Intake 1235 ml   Output 1830 ml   Net -595 ml       Physical Exam  Physical Exam  Constitutional:       General: He is not in acute distress.     Appearance: Normal appearance.   HENT:      Mouth/Throat:      Mouth: Mucous membranes are moist.   Eyes:      Conjunctiva/sclera: Conjunctivae normal.   Cardiovascular:      Rate and Rhythm: Normal rate and regular rhythm.      Pulses: Normal pulses.      Heart sounds: Normal heart sounds.      Comments: NSR 60-70  V " wires  Pulmonary:      Effort: Pulmonary effort is normal.      Breath sounds: Normal breath sounds.   Abdominal:      General: Abdomen is flat. Bowel sounds are normal.      Palpations: Abdomen is soft.   Musculoskeletal:      Right lower leg: No edema.      Left lower leg: No edema.   Skin:     General: Skin is warm and dry.      Capillary Refill: Capillary refill takes less than 2 seconds.      Comments: Chest tube site an thoracotomy site with dressing, clean, dry and intact   Neurological:      General: No focal deficit present.      Mental Status: He is alert.         Medications  Scheduled medications  acetaminophen, 975 mg, oral, q8h  aspirin, 81 mg, oral, Daily  cholecalciferol, 2,000 Units, oral, Daily  cyanocobalamin, 1,000 mcg, oral, Daily  heparin (porcine), 5,000 Units, subcutaneous, q8h  iron polysaccharides, 150 mg, oral, Daily  metoprolol tartrate, 25 mg, oral, BID  oxygen, , inhalation, Continuous - 02/gases  polyethylene glycol, 17 g, oral, Daily  potassium, sodium phosphates, 1 packet, oral, 4x daily  sennosides-docusate sodium, 2 tablet, oral, BID    Continuous medications   PRN medications  PRN medications: bisacodyl, naloxone, oxyCODONE, oxyCODONE    Labs  Results for orders placed or performed during the hospital encounter of 03/12/25 (from the past 24 hours)   POCT GLUCOSE   Result Value Ref Range    POCT Glucose 119 (H) 74 - 99 mg/dL   Magnesium   Result Value Ref Range    Magnesium 1.88 1.60 - 2.40 mg/dL   CBC   Result Value Ref Range    WBC 8.1 4.4 - 11.3 x10*3/uL    nRBC 0.0 0.0 - 0.0 /100 WBCs    RBC 3.74 (L) 4.50 - 5.90 x10*6/uL    Hemoglobin 12.0 (L) 13.5 - 17.5 g/dL    Hematocrit 35.4 (L) 41.0 - 52.0 %    MCV 95 80 - 100 fL    MCH 32.1 26.0 - 34.0 pg    MCHC 33.9 32.0 - 36.0 g/dL    RDW 13.4 11.5 - 14.5 %    Platelets 98 (L) 150 - 450 x10*3/uL   Renal Function Panel   Result Value Ref Range    Glucose 91 74 - 99 mg/dL    Sodium 138 136 - 145 mmol/L    Potassium 3.7 3.5 - 5.3 mmol/L     Chloride 105 98 - 107 mmol/L    Bicarbonate 28 21 - 32 mmol/L    Anion Gap 9 (L) 10 - 20 mmol/L    Urea Nitrogen 15 6 - 23 mg/dL    Creatinine 0.86 0.50 - 1.30 mg/dL    eGFR >90 >60 mL/min/1.73m*2    Calcium 8.4 (L) 8.6 - 10.6 mg/dL    Phosphorus 1.7 (L) 2.5 - 4.9 mg/dL    Albumin 3.4 3.4 - 5.0 g/dL   Transthoracic Echo (TTE) Complete   Result Value Ref Range    AV pk dom 1.22 m/s    LVOT diam 2.39 cm    MV E/A ratio 1.01     LA vol index A/L 41.3 ml/m2    LV EF 63 %    LVIDd 4.86 cm    RVSP 27.8 mmHg    Aortic Valve Area by Continuity of Peak Velocity 3.64 cm2    AV pk grad 6 mmHg    LV A4C EF 58.7                IMAGING/ DIAGNOSTIC TESTING:  I have personally reviewed the following test result(s):          IMPRESSION & PLAN:  POD # 2 s/p MVr with Dr. Ennis and Dr. Vaca  - Increase activity/ ambulation; PT/OT  - Encourage IS, C/DB; respiratory therapy; wean O2 as alejandro   - Cardiac rehab referral   - Continue cardiac meds: ASA, BB  - Pain and anticonstipation meds  - 2v CXR ordered for 3/21  - Postop echo completed 3/20  - Cut epicardial wires prior to discharge   - Tele until discharge  - Optimize nutrition and electrolytes    Rhythm  - Tele: NSR 60-70s  - Continue BB, increase to 25 mg BID  - Adjust medications as tolerated    Acute Blood Loss Anemia   Recent Labs     03/20/25  0635 03/19/25  0016 03/18/25  1443 03/17/25  0552 03/16/25  0812 03/15/25  0523 03/14/25  0650   HGB 12.0* 13.0* 13.8 14.1 14.8 14.2 14.7   HCT 35.4* 37.8* 41.0 41.4 43.8 41.5 43.0   - MV, PO Iron x1mo  - Daily labs, transfuse as indicated    Thrombocytopenia  Recent Labs     03/20/25  0635 03/19/25  0016 03/18/25  1443 03/17/25  0552 03/16/25  0812 03/15/25  0523 03/14/25  0650   PLT 98* 102* 144* 139* 143* 146* 149*   - Etiology likely postop/CPB related  - Continue to trend with daily CBCs    Volume/Electrolyte Status: Preop wt Weight: 68.6 kg (151 lb 3.2 oz)   Vitals:    03/20/25 0604   Weight: 69.4 kg (152 lb 14.4 oz)     - 3/20:  euvolemic, Kphos  - Adjust diuresis as needed for postop cardiac surgery hypervolemia  - Replete electrolytes for hypokalemia/hypomagnesemia/hypophosphatemia as needed   - Daily weights and strict I&Os  - Daily RFP while admitted    Leukocytosis  Recent Labs     25  0635 25  0016 25  1443 25  0552 25  0812 03/15/25  0523 25  0650   WBC 8.1 7.9 13.1* 3.8* 3.7* 3.4* 3.7*     Temp (36hrs), Av.9 °C (98.4 °F), Min:36.3 °C (97.3 °F), Max:37.4 °C (99.3 °F)     - aggressive pulmonary hygiene  - monitor for s/s infection  - likely atelectasis/ postoperative in etiology  - daily CBC to follow    VTE Prophylaxis: SCDs/TEDs, ambulation, SQ heparin  Code Status: Full Code    Dispo  - PT/OT recs low intensity  - Would benefit from homecare RN for cardiac surgery carepath  - Anticipate discharge tomorrow pending 2v CXR  - Will continue to assess discharge needs      JORDI Miguel-CNP  Cardiac Surgery [facility]AD[facility]  [facility]  Team Phone [phone]    3/20/2025  12:31 PM

## 2025-03-20 NOTE — PROGRESS NOTES
Occupational Therapy    Evaluation    Patient Name: Rodney Mccabe  MRN: [id number]  Today's Date: 3/20/2025  Time Calculation  Start Time: 1144  Stop Time: 1153  Time Calculation (min): 9 min    Assessment  IP OT Assessment  OT Assessment: Pt was independent with functional ambulation, bed mobility and transfers. Denied any difficulties with ADLs.  Prognosis: Good  Barriers to Discharge Home: No anticipated barriers  Evaluation/Treatment Tolerance: Patient tolerated treatment well  Medical Staff Made Aware: Yes  End of Session Communication: Bedside nurse  End of Session Patient Position: Bed, 3 rail up, Alarm off, caregiver present  Plan:  No Skilled OT: At baseline function  OT Frequency: OT eval only  OT Discharge Recommendations: No further acute OT, No OT needed after discharge  OT Recommended Transfer Status: Independent  OT - OK to Discharge: Yes    Subjective   Current Problem:  1. Severe mitral regurgitation  Carotid duplex bilateral    Carotid duplex bilateral    Anesthesia Intraoperative Transesophageal Echocardiogram    Anesthesia Intraoperative Transesophageal Echocardiogram      2. Mitral valve insufficiency, unspecified etiology  Transthoracic Echo (TTE) Complete    Transthoracic Echo (TTE) Complete    Case Request Cath Lab: Left Heart Cath    Case Request Cath Lab: Left Heart Cath    Cardiac Catheterization Procedure    Cardiac Catheterization Procedure      3. Encounter for preprocedural cardiovascular examination  Carotid duplex bilateral      4. Other nonrheumatic mitral valve disorders  Cardiac Catheterization Procedure      5. S/P MVR (mitral valve repair)  Transthoracic Echo (TTE) Complete    Transthoracic Echo (TTE) Complete        General:  Reason for Referral: s/p Robotic assisted MVR using size 34 mm CG future annuloplasty band 3/18.  Past Medical History Relevant to Rehab: MVP, severe MR, CAD, hep c (treated), B-cell lymphoma (2004/2017) s/p radiation, back surgery, former smoker  Prior to  Session Communication: Bedside nurse  Patient Position Received: Bed, 3 rail up, Alarm off, not on at start of session  Family/Caregiver Present: Yes  Caregiver Feedback: pt's wife was present and supportive   Precautions:  Medical Precautions: Cardiac precautions    Pain:  Pain Assessment  Pain Assessment: 0-10  0-10 (Numeric) Pain Score: 0 - No pain        Objective   Cognition:  Overall Cognitive Status: Within Functional Limits  Orientation Level: Oriented X4  Safety/Judgement: Within Functional Limits  Insight: Within function limits           Home Living:  Type of Home: House  Lives With: Significant other  Home Adaptive Equipment: None  Home Layout: Two level  Home Access: Stairs to enter without rails  Entrance Stairs-Rails: None  Bathroom Shower/Tub: Tub/shower unit  Bathroom Toilet: Standard  Bathroom Equipment: Bedside commode   Prior Function:  Level of [place]: Independent with ADLs and functional transfers, Independent with homemaking with ambulation  ADL Assistance: Independent  Homemaking Assistance: Independent  Ambulatory Assistance: Independent     ADL:  Eating Assistance: Independent  Grooming Assistance: Independent  LE Dressing Assistance: Modified independent (Device)  LE Dressing Deficit:  (anticipate, pt had shorts on already deferred donning pants at this time.)  Toileting Assistance with Device: Modified independent  Toileting Deficit:  (anticipate)  Activity Tolerance:  Endurance: Endurance does not limit participation in activity  Balance:  Dynamic Standing Balance  Dynamic Standing-Level of Assistance: Independent  Dynamic Standing-Balance:  (Pt completed functional ambulation with good dynamic standing balance.)  Bed Mobility/Transfers: Bed Mobility  Bed Mobility: Yes  Bed Mobility 1  Bed Mobility 1: Supine to sitting  Level of Assistance 1: Modified independent  Bed Mobility Comments 1: HOB elevated  Bed Mobility 2  Bed Mobility  2: Sitting to supine  Level of Assistance 2:  Modified independent  Bed Mobility Comments 2: HOB elevated   and Transfers  Transfer: Yes  Transfer 1  Transfer From 1: Bed to  Transfer to 1: Stand  Technique 1: Sit to stand  Transfer Level of Assistance 1: Independent  Transfers 2  Transfer From 2: Stand to  Transfer to 2: Bed  Technique 2: Stand to sit  Transfer Level of Assistance 2: Independent     Vision: Vision - Basic Assessment  Current Vision: No visual deficits   and Vision - Complex Assessment  Ocular Range of Motion: Within Functional Limits  Sensation:  Light Touch: No apparent deficits     Perception:  Inattention/Neglect: Appears intact  Coordination:  Movements are Fluid and Coordinated: Yes   Hand Function:  Hand Function  Gross Grasp: Functional  Coordination: Functional  Extremities: RUE   RUE : Within Functional Limits, LUE   LUE: Within Functional Limits,      Outcome Measures: [facility] Daily Activity  Putting on and taking off regular lower body clothing: None  Bathing (including washing, rinsing, drying): None  Putting on and taking off regular upper body clothing: None  Toileting, which includes using toilet, bedpan or urinal: None  Taking care of personal grooming such as brushing teeth: None  Eating Meals: None  Daily Activity - Total Score: 24         ,     OT Adult Other Outcome Measures  4AT: negative    Education Documentation  Body Mechanics, taught by Danish Perez OT at 3/20/2025  3:28 PM.  Learner: Patient  Readiness: Acceptance  Method: Explanation  Response: Verbalizes Understanding    ADL Training, taught by Danish Perez OT at 3/20/2025  3:28 PM.  Learner: Patient  Readiness: Acceptance  Method: Explanation  Response: Verbalizes Understanding    Education Comments  No comments found.          03/20/25 at 3:29 PM   Danish Perez OTR/OTD  Rehab Office: [phone]

## 2025-03-21 ENCOUNTER — APPOINTMENT (OUTPATIENT)
Dept: RADIOLOGY | Facility: HOSPITAL | Age: 65
DRG: 216 | End: 2025-03-21
Payer: OTHER GOVERNMENT

## 2025-03-21 LAB
ALBUMIN SERPL BCP-MCNC: 3.5 G/DL (ref 3.4–5)
ANION GAP SERPL CALC-SCNC: 9 MMOL/L (ref 10–20)
BUN SERPL-MCNC: 14 MG/DL (ref 6–23)
CALCIUM SERPL-MCNC: 8.7 MG/DL (ref 8.6–10.6)
CHLORIDE SERPL-SCNC: 105 MMOL/L (ref 98–107)
CO2 SERPL-SCNC: 29 MMOL/L (ref 21–32)
CREAT SERPL-MCNC: 0.78 MG/DL (ref 0.5–1.3)
EGFRCR SERPLBLD CKD-EPI 2021: >90 ML/MIN/1.73M*2
ERYTHROCYTE [DISTWIDTH] IN BLOOD BY AUTOMATED COUNT: 13.2 % (ref 11.5–14.5)
GLUCOSE SERPL-MCNC: 116 MG/DL (ref 74–99)
HCT VFR BLD AUTO: 36.7 % (ref 41–52)
HGB BLD-MCNC: 12.1 G/DL (ref 13.5–17.5)
MAGNESIUM SERPL-MCNC: 1.99 MG/DL (ref 1.6–2.4)
MCH RBC QN AUTO: 31.3 PG (ref 26–34)
MCHC RBC AUTO-ENTMCNC: 33 G/DL (ref 32–36)
MCV RBC AUTO: 95 FL (ref 80–100)
NRBC BLD-RTO: 0 /100 WBCS (ref 0–0)
PHOSPHATE SERPL-MCNC: 1.9 MG/DL (ref 2.5–4.9)
PLATELET # BLD AUTO: 118 X10*3/UL (ref 150–450)
POTASSIUM SERPL-SCNC: 3.7 MMOL/L (ref 3.5–5.3)
RBC # BLD AUTO: 3.87 X10*6/UL (ref 4.5–5.9)
SODIUM SERPL-SCNC: 139 MMOL/L (ref 136–145)
WBC # BLD AUTO: 5.3 X10*3/UL (ref 4.4–11.3)

## 2025-03-21 PROCEDURE — 99232 SBSQ HOSP IP/OBS MODERATE 35: CPT | Performed by: NURSE PRACTITIONER

## 2025-03-21 PROCEDURE — 2500000001 HC RX 250 WO HCPCS SELF ADMINISTERED DRUGS (ALT 637 FOR MEDICARE OP)

## 2025-03-21 PROCEDURE — 2500000004 HC RX 250 GENERAL PHARMACY W/ HCPCS (ALT 636 FOR OP/ED)

## 2025-03-21 PROCEDURE — 85027 COMPLETE CBC AUTOMATED: CPT

## 2025-03-21 PROCEDURE — 2500000001 HC RX 250 WO HCPCS SELF ADMINISTERED DRUGS (ALT 637 FOR MEDICARE OP): Performed by: NURSE PRACTITIONER

## 2025-03-21 PROCEDURE — 2500000004 HC RX 250 GENERAL PHARMACY W/ HCPCS (ALT 636 FOR OP/ED): Performed by: NURSE PRACTITIONER

## 2025-03-21 PROCEDURE — 80069 RENAL FUNCTION PANEL: CPT

## 2025-03-21 PROCEDURE — 2500000002 HC RX 250 W HCPCS SELF ADMINISTERED DRUGS (ALT 637 FOR MEDICARE OP, ALT 636 FOR OP/ED): Performed by: NURSE PRACTITIONER

## 2025-03-21 PROCEDURE — 83735 ASSAY OF MAGNESIUM: CPT

## 2025-03-21 PROCEDURE — 1200000002 HC GENERAL ROOM WITH TELEMETRY DAILY

## 2025-03-21 PROCEDURE — 71046 X-RAY EXAM CHEST 2 VIEWS: CPT

## 2025-03-21 PROCEDURE — 2500000005 HC RX 250 GENERAL PHARMACY W/O HCPCS

## 2025-03-21 PROCEDURE — 36415 COLL VENOUS BLD VENIPUNCTURE: CPT

## 2025-03-21 RX ORDER — LANOLIN ALCOHOL/MO/W.PET/CERES
400 CREAM (GRAM) TOPICAL ONCE
Status: COMPLETED | OUTPATIENT
Start: 2025-03-21 | End: 2025-03-21

## 2025-03-21 RX ORDER — POTASSIUM CHLORIDE 20 MEQ/1
40 TABLET, EXTENDED RELEASE ORAL ONCE
Status: COMPLETED | OUTPATIENT
Start: 2025-03-21 | End: 2025-03-21

## 2025-03-21 RX ORDER — METOPROLOL TARTRATE 50 MG/1
50 TABLET ORAL 2 TIMES DAILY
Status: DISCONTINUED | OUTPATIENT
Start: 2025-03-21 | End: 2025-03-23 | Stop reason: HOSPADM

## 2025-03-21 RX ORDER — DOCUSATE SODIUM 100 MG/1
100 CAPSULE, LIQUID FILLED ORAL 2 TIMES DAILY PRN
COMMUNITY
Start: 2025-03-21

## 2025-03-21 RX ADMIN — HEPARIN SODIUM 5000 UNITS: 5000 INJECTION, SOLUTION INTRAVENOUS; SUBCUTANEOUS at 00:49

## 2025-03-21 RX ADMIN — METOPROLOL TARTRATE 37.5 MG: 25 TABLET, FILM COATED ORAL at 08:43

## 2025-03-21 RX ADMIN — HEPARIN SODIUM 5000 UNITS: 5000 INJECTION, SOLUTION INTRAVENOUS; SUBCUTANEOUS at 08:43

## 2025-03-21 RX ADMIN — HEPARIN SODIUM 5000 UNITS: 5000 INJECTION, SOLUTION INTRAVENOUS; SUBCUTANEOUS at 17:55

## 2025-03-21 RX ADMIN — MAGNESIUM OXIDE TAB 400 MG (241.3 MG ELEMENTAL MG) 400 MG: 400 (241.3 MG) TAB at 15:09

## 2025-03-21 RX ADMIN — CHOLECALCIFEROL TAB 25 MCG (1000 UNIT) 2000 UNITS: 25 TAB at 08:42

## 2025-03-21 RX ADMIN — METOPROLOL TARTRATE 50 MG: 50 TABLET, FILM COATED ORAL at 22:11

## 2025-03-21 RX ADMIN — ACETAMINOPHEN 975 MG: 325 TABLET, FILM COATED ORAL at 15:09

## 2025-03-21 RX ADMIN — ACETAMINOPHEN 975 MG: 325 TABLET, FILM COATED ORAL at 06:27

## 2025-03-21 RX ADMIN — POTASSIUM CHLORIDE 40 MEQ: 1500 TABLET, EXTENDED RELEASE ORAL at 15:09

## 2025-03-21 RX ADMIN — Medication 0.5 L/MIN: at 09:36

## 2025-03-21 RX ADMIN — CYANOCOBALAMIN TAB 1000 MCG 1000 MCG: 1000 TAB at 08:42

## 2025-03-21 RX ADMIN — ASPIRIN 81 MG: 81 TABLET, COATED ORAL at 08:42

## 2025-03-21 ASSESSMENT — COGNITIVE AND FUNCTIONAL STATUS - GENERAL
CLIMB 3 TO 5 STEPS WITH RAILING: A LITTLE
DAILY ACTIVITIY SCORE: 24
MOBILITY SCORE: 23

## 2025-03-21 ASSESSMENT — PAIN - FUNCTIONAL ASSESSMENT
PAIN_FUNCTIONAL_ASSESSMENT: 0-10

## 2025-03-21 ASSESSMENT — PAIN SCALES - GENERAL
PAINLEVEL_OUTOF10: 0 - NO PAIN
PAINLEVEL_OUTOF10: 4
PAINLEVEL_OUTOF10: 0 - NO PAIN

## 2025-03-21 NOTE — DISCHARGE INSTRUCTIONS
**IMPORTANT**  Prevention of Infective Endocarditis (Heart Infections) After Cardiac Surgery:    Infective endocarditis occurs when bacteria enters the bloodstream and then attaches to the heart. Patients with a new heart valve, repaired heart valve, or graft used to repair/replace their aorta are at higher risk for developing this because of the prosthetic (man-made) medical material in their heart. Endocarditis is rare but when you undergo certain medical or dental procedures, as listed below, it can give bacteria an opportunity to enter the blood and cause this type of infection. To prevent this, preventative antibiotics taken before these procedures are required.     Antibiotics are always required PRIOR to the following:  - Dental work with gingival, periapical, or other gingival perforation (such as tooth extractions, dental abscess drainage, and dental cleanings)  - Respiratory procedures with incisions or biopsies   - Cardiac or vascular procedures to place or replace prosthetic material (such as heart valves, stents, etc.)    Antibiotics are required in the following situations ONLY if an infection is already present:  - Skin or soft tissue procedures with infected tissue  - Gastrointestinal procedures with GI infections  - Urinary or genital procedure with acute genitourinary infections    Antibiotic examples you should expect to be prescribed:  - Amoxicillin or Ampicillin are usually the  first choice  - Cephalexin, Clindamycin, or Vancomycin may be used if you are unable to tolerate/allergic to Amoxicillin or Ampicillin  - Amoxicillin or Ampicillin (if allergic, use Vancomycin) will cover for enterococcus if you have a known acute biliary tract infection   - Specific antibiotics for a known organism should be used when treating an active infection    Please notify your dentist/primary physician/cardiologist PRIOR to these types of procedures to obtain the proper  antibiotics and prevent a serious infection in your heart. When in doubt, always ask!   Additionally, good oral hygiene (routine brushing, flossing, and dental care) and prompt treatment of other infections (such as UTIs and skin infections) are equally as important to prevent endocarditis!!          Additional Post-Operative Instructions:  - Remember to use your Incentive spirometer 10x/hr while awake. Remember to cough and deep breath.  - No NSAIDs (common over-the-counter NSAIDs are ibuprofen/Motrin/Advil, naproxen/Naprosyn/Aleve) for 3 months after cardiac surgery; if NSAIDs needed after 3 months, clear use with cardiologist before starting.       Your home medications may have changed after surgery. Carefully compare this list with your prescription bottles at home and set aside any medications you are told to not take so you do not confuse them. Do not dispose of any medications until your follow-up, since your doctors may restart some at your follow-up appointments. It is important to bring a complete, current list of your medications to any medical appointments or hospitalizations.    For any questions about your discharge, please call the cardiac surgery office at [phone]

## 2025-03-21 NOTE — PROGRESS NOTES
"CARDIAC SURGERY DAILY PROGRESS NOTE    Rodney Mccabe is a 63 y/o male with PMH severe Mitral Regurgitation, MVP, non-obstructive CAD, treated Hep C, B Cell lymphoma 2004 and recurrence in 2017 treated with radiation, former smoker. Patient admitted on 3/12 in preparation for cardiac surgery. At time of admission patient is asymptomatic. No chest pain, shortness of breath. Plan for MV procedure with Dr. Ennis on 3/18.     OPERATION/PROCEDURE:   ROBOTIC MITRAL VALVE REPAIR USING SIZE 34MM CG FUTURE ANNULOPLASTY BAND; RIGHT GROIN CUTDOWN UING INTRACLUDE BALLOON  [address] - [place] VLVP MITRAL VALVE W/CARD BYP W/PROSTC RING  .  Cut down right groin for direct right femoral artery & right femoral vein cannulation  Right mini thoracotomy  Mitral valve repair with 34mm CG Future Band & 2 gortex chordae with Kimmie Ennis MD  On 3/18/25    CTICU Course: Uneventful  Transferred to T3 on 3/19/25      Interval History:   Afib RVR last evening and overnight; currently SR 70s    SUBJECTIVE:  Feels well. Walking the unit. Pain well controlled.     Objective   /70 (BP Location: Right arm, Patient Position: Lying)   Pulse 75   Temp 36.9 °C (98.4 °F) (Temporal)   Resp 18   Ht 1.753 m (5' 9\")   Wt 67.8 kg (149 lb 7.6 oz)   SpO2 95%   BMI 22.07 kg/m²   0-10 (Numeric) Pain Score: 0 - No pain   3 Day Weight Change: 0.132 kg (4.7 oz) per day    Intake and Output    Intake/Output Summary (Last 24 hours) at 3/21/2025 1421  Last data filed at 3/21/2025 1315  Gross per 24 hour   Intake 950 ml   Output 2400 ml   Net -1450 ml       Physical Exam  Physical Exam  Constitutional:       General: He is not in acute distress.  HENT:      Mouth/Throat:      Mouth: Mucous membranes are moist.   Eyes:      Conjunctiva/sclera: Conjunctivae normal.   Cardiovascular:      Rate and Rhythm: Normal rate and regular rhythm.      Pulses: Normal pulses.      Heart sounds: Normal heart sounds.      Comments: NSR 60-70  V wires  Pulmonary:      Effort: " Followed protocol Pulmonary effort is normal. No respiratory distress.      Breath sounds: Normal breath sounds.   Abdominal:      General: Abdomen is flat. Bowel sounds are normal.      Palpations: Abdomen is soft.   Musculoskeletal:      Right lower leg: No edema.      Left lower leg: No edema.   Skin:     General: Skin is warm and dry.      Capillary Refill: Capillary refill takes less than 2 seconds.      Comments: Chest tube site an thoracotomy site with dressing, clean, dry and intact   Neurological:      General: No focal deficit present.      Mental Status: He is alert and oriented to person, place, and time.   Psychiatric:         Mood and Affect: Mood normal.         Behavior: Behavior normal.         Medications  Scheduled medications  acetaminophen, 975 mg, oral, q8h  aspirin, 81 mg, oral, Daily  cholecalciferol, 2,000 Units, oral, Daily  cyanocobalamin, 1,000 mcg, oral, Daily  heparin (porcine), 5,000 Units, subcutaneous, q8h  metoprolol tartrate, 37.5 mg, oral, BID  oxygen, , inhalation, Continuous - 02/gases  polyethylene glycol, 17 g, oral, Daily  potassium chloride CR, 40 mEq, oral, Once  sennosides-docusate sodium, 2 tablet, oral, BID    Continuous medications   PRN medications  PRN medications: bisacodyl, naloxone, oxyCODONE, oxyCODONE    Labs  Results for orders placed or performed during the hospital encounter of 03/12/25 (from the past 24 hours)   CBC   Result Value Ref Range    WBC 5.3 4.4 - 11.3 x10*3/uL    nRBC 0.0 0.0 - 0.0 /100 WBCs    RBC 3.87 (L) 4.50 - 5.90 x10*6/uL    Hemoglobin 12.1 (L) 13.5 - 17.5 g/dL    Hematocrit 36.7 (L) 41.0 - 52.0 %    MCV 95 80 - 100 fL    MCH 31.3 26.0 - 34.0 pg    MCHC 33.0 32.0 - 36.0 g/dL    RDW 13.2 11.5 - 14.5 %    Platelets 118 (L) 150 - 450 x10*3/uL   Magnesium   Result Value Ref Range    Magnesium 1.99 1.60 - 2.40 mg/dL   Renal Function Panel   Result Value Ref Range    Glucose 116 (H) 74 - 99 mg/dL    Sodium 139 136 - 145 mmol/L    Potassium 3.7 3.5 - 5.3 mmol/L     Chloride 105 98 - 107 mmol/L    Bicarbonate 29 21 - 32 mmol/L    Anion Gap 9 (L) 10 - 20 mmol/L    Urea Nitrogen 14 6 - 23 mg/dL    Creatinine 0.78 0.50 - 1.30 mg/dL    eGFR >90 >60 mL/min/1.73m*2    Calcium 8.7 8.6 - 10.6 mg/dL    Phosphorus 1.9 (L) 2.5 - 4.9 mg/dL    Albumin 3.5 3.4 - 5.0 g/dL               IMAGING/ DIAGNOSTIC TESTING:  I have personally reviewed the following test result(s):      XR chest 1 view 03/20/2025  Impression  1.  No significant interval change.        Transthoracic Echo (TTE) Complete 03/20/2025  CONCLUSIONS:  1. Left ventricular ejection fraction is normal, by visual estimate at 60-65%.  2. Left ventricular cavity size is moderately dilated.  3. Abnormal septal motion consistent with post-operative status.  4. There is normal right ventricular global systolic function.  5. The left atrium is mildly dilated.  6. Status post mitral valve repair.  7. 3/18/2025 34mm CG annuloplasty band.  8. Right ventricular systolic pressure is within normal limits.          IMPRESSION & PLAN:  POD # 3 s/p MVr with Dr. Ennis and Dr. Vaca  - Increase activity/ ambulation; PT/OT  - Encourage IS, C/DB; respiratory therapy; wean O2 as alejandro   - Cardiac rehab referral   - Continue cardiac meds: ASA, BB  - Pain and anticonstipation meds  - 2v CXR 3/21  - Postop echo completed 3/20  - Cut epicardial wires prior to discharge   - Tele until discharge  - Optimize nutrition and electrolytes    Rhythm  - Tele: NSR 60-70s; afib RVR last evening and overnight  - Continue BB, increase to 50 mg BID  - Adjust medications as tolerated    Acute Blood Loss Anemia   Recent Labs     03/21/25  1205 03/20/25  0635 03/19/25  0016 03/18/25  1443 03/17/25  0552 03/16/25  0812 03/15/25  0523   HGB 12.1* 12.0* 13.0* 13.8 14.1 14.8 14.2   HCT 36.7* 35.4* 37.8* 41.0 41.4 43.8 41.5   - MV, PO Iron x1mo  - Daily labs, transfuse as indicated    Thrombocytopenia  Recent Labs     03/21/25  1205 03/20/25  0635 03/19/25  0016 03/18/25  1443  25  0552 25  0812 03/15/25  0523   * 98* 102* 144* 139* 143* 146*   - Etiology likely postop/CPB related  - Continue to trend with daily CBCs    Volume/Electrolyte Status: Preop wt Weight: 68.6 kg (151 lb 3.2 oz)   Vitals:    25 0613   Weight: 67.8 kg (149 lb 7.6 oz)     - 3/20: euvolemic, Kphos  - Adjust diuresis as needed for postop cardiac surgery hypervolemia  - Replete electrolytes for hypokalemia/hypomagnesemia/hypophosphatemia as needed; K replaced 3/21  - Daily weights and strict I&Os  - Daily RFP while admitted    Leukocytosis  Recent Labs     25  1205 25  0635 25  0016 25  1443 25  0552 25  0812 03/15/25  0523   WBC 5.3 8.1 7.9 13.1* 3.8* 3.7* 3.4*     Temp (36hrs), Av.7 °C (98.1 °F), Min:36.4 °C (97.5 °F), Max:36.9 °C (98.4 °F)     - aggressive pulmonary hygiene  - monitor for s/s infection  - likely atelectasis/ postoperative in etiology  - daily CBC to follow    VTE Prophylaxis: SCDs/TEDs, ambulation, SQ heparin  Code Status: Full Code    Dispo  - PT/OT recs low intensity  - Would benefit from homecare RN for cardiac surgery carepath  - Anticipate discharge tomorrow pending stable rhythm  - Will continue to assess discharge needs      JORDI Montesinos-CNP  Cardiac Surgery [facility]AD[facility]  [facility]  Team Phone [phone]    3/21/2025  2:21 PM

## 2025-03-21 NOTE — PROGRESS NOTES
Transitional Care Coordination Progress Note:  Patient was discussed during interdisciplinary rounds.  Team members present: Cardiac surgery NP's, HN and TCC  Plan per surgical team: Monitoring rhythm  [facility]ayer: [facility]  Status: Inpatient  Discharge disposition: [facility]   Potential barriers: none  ADOD: Sat/Carleen Jim RN  Clinical information including Referral to Home Health was faxed to [facility]. Billie Castañeda fax: [phone], phone [phone]

## 2025-03-22 LAB
ALBUMIN SERPL BCP-MCNC: 3.8 G/DL (ref 3.4–5)
ANION GAP SERPL CALC-SCNC: 12 MMOL/L (ref 10–20)
BUN SERPL-MCNC: 12 MG/DL (ref 6–23)
CALCIUM SERPL-MCNC: 9.2 MG/DL (ref 8.6–10.6)
CHLORIDE SERPL-SCNC: 106 MMOL/L (ref 98–107)
CO2 SERPL-SCNC: 25 MMOL/L (ref 21–32)
CREAT SERPL-MCNC: 0.75 MG/DL (ref 0.5–1.3)
EGFRCR SERPLBLD CKD-EPI 2021: >90 ML/MIN/1.73M*2
ERYTHROCYTE [DISTWIDTH] IN BLOOD BY AUTOMATED COUNT: 13.2 % (ref 11.5–14.5)
GLUCOSE SERPL-MCNC: 119 MG/DL (ref 74–99)
HCT VFR BLD AUTO: 40.9 % (ref 41–52)
HGB BLD-MCNC: 13.3 G/DL (ref 13.5–17.5)
MAGNESIUM SERPL-MCNC: 1.86 MG/DL (ref 1.6–2.4)
MCH RBC QN AUTO: 31.3 PG (ref 26–34)
MCHC RBC AUTO-ENTMCNC: 32.5 G/DL (ref 32–36)
MCV RBC AUTO: 96 FL (ref 80–100)
NRBC BLD-RTO: 0 /100 WBCS (ref 0–0)
PHOSPHATE SERPL-MCNC: 2.5 MG/DL (ref 2.5–4.9)
PLATELET # BLD AUTO: 155 X10*3/UL (ref 150–450)
POTASSIUM SERPL-SCNC: 4 MMOL/L (ref 3.5–5.3)
RBC # BLD AUTO: 4.25 X10*6/UL (ref 4.5–5.9)
SODIUM SERPL-SCNC: 139 MMOL/L (ref 136–145)
WBC # BLD AUTO: 4.8 X10*3/UL (ref 4.4–11.3)

## 2025-03-22 PROCEDURE — 2500000005 HC RX 250 GENERAL PHARMACY W/O HCPCS

## 2025-03-22 PROCEDURE — 85027 COMPLETE CBC AUTOMATED: CPT

## 2025-03-22 PROCEDURE — 2500000001 HC RX 250 WO HCPCS SELF ADMINISTERED DRUGS (ALT 637 FOR MEDICARE OP): Performed by: NURSE PRACTITIONER

## 2025-03-22 PROCEDURE — 99232 SBSQ HOSP IP/OBS MODERATE 35: CPT | Performed by: NURSE PRACTITIONER

## 2025-03-22 PROCEDURE — 2500000004 HC RX 250 GENERAL PHARMACY W/ HCPCS (ALT 636 FOR OP/ED)

## 2025-03-22 PROCEDURE — 94640 AIRWAY INHALATION TREATMENT: CPT

## 2025-03-22 PROCEDURE — 83735 ASSAY OF MAGNESIUM: CPT

## 2025-03-22 PROCEDURE — 1100000001 HC PRIVATE ROOM DAILY

## 2025-03-22 PROCEDURE — 2500000004 HC RX 250 GENERAL PHARMACY W/ HCPCS (ALT 636 FOR OP/ED): Performed by: NURSE PRACTITIONER

## 2025-03-22 PROCEDURE — 36415 COLL VENOUS BLD VENIPUNCTURE: CPT

## 2025-03-22 PROCEDURE — 84100 ASSAY OF PHOSPHORUS: CPT

## 2025-03-22 RX ORDER — LANOLIN ALCOHOL/MO/W.PET/CERES
400 CREAM (GRAM) TOPICAL ONCE
Status: COMPLETED | OUTPATIENT
Start: 2025-03-22 | End: 2025-03-22

## 2025-03-22 RX ADMIN — Medication 0.5 L/MIN: at 08:09

## 2025-03-22 RX ADMIN — ASPIRIN 81 MG: 81 TABLET, COATED ORAL at 08:05

## 2025-03-22 RX ADMIN — CHOLECALCIFEROL TAB 25 MCG (1000 UNIT) 2000 UNITS: 25 TAB at 08:05

## 2025-03-22 RX ADMIN — CYANOCOBALAMIN TAB 1000 MCG 1000 MCG: 1000 TAB at 08:06

## 2025-03-22 RX ADMIN — METOPROLOL TARTRATE 50 MG: 50 TABLET, FILM COATED ORAL at 08:05

## 2025-03-22 RX ADMIN — METOPROLOL TARTRATE 50 MG: 50 TABLET, FILM COATED ORAL at 20:31

## 2025-03-22 RX ADMIN — MAGNESIUM OXIDE TAB 400 MG (241.3 MG ELEMENTAL MG) 400 MG: 400 (241.3 MG) TAB at 09:47

## 2025-03-22 RX ADMIN — HEPARIN SODIUM 5000 UNITS: 5000 INJECTION, SOLUTION INTRAVENOUS; SUBCUTANEOUS at 08:06

## 2025-03-22 ASSESSMENT — COGNITIVE AND FUNCTIONAL STATUS - GENERAL
CLIMB 3 TO 5 STEPS WITH RAILING: A LITTLE
MOBILITY SCORE: 23
DAILY ACTIVITIY SCORE: 24

## 2025-03-22 ASSESSMENT — PAIN - FUNCTIONAL ASSESSMENT
PAIN_FUNCTIONAL_ASSESSMENT: 0-10
PAIN_FUNCTIONAL_ASSESSMENT: 0-10

## 2025-03-22 ASSESSMENT — PAIN SCALES - GENERAL
PAINLEVEL_OUTOF10: 0 - NO PAIN
PAINLEVEL_OUTOF10: 0 - NO PAIN

## 2025-03-22 NOTE — PROGRESS NOTES
"CARDIAC SURGERY DAILY PROGRESS NOTE    Rodney Mccabe is a 65 y/o male with PMH severe Mitral Regurgitation, MVP, non-obstructive CAD, treated Hep C, B Cell lymphoma 2004 and recurrence in 2017 treated with radiation, former smoker. Patient admitted on 3/12 in preparation for cardiac surgery. At time of admission patient is asymptomatic. No chest pain, shortness of breath. Plan for MV procedure with Dr. Ennis on 3/18.     OPERATION/PROCEDURE:   ROBOTIC MITRAL VALVE REPAIR USING SIZE 34MM CG FUTURE ANNULOPLASTY BAND; RIGHT GROIN CUTDOWN UING INTRACLUDE BALLOON  37317 - NE VLVP MITRAL VALVE W/CARD BYP W/PROSTC RING  .  Cut down right groin for direct right femoral artery & right femoral vein cannulation  Right mini thoracotomy  Mitral valve repair with 34mm CG Future Band & 2 gortex chordae with Kimmie Ennis MD on 3/18/25    CTICU Course: Uneventful    Transferred to T3 on 3/19/25    ====================================    Interval History:   No Afib overnight. No other overnight issues.     SUBJECTIVE:  Feels well. Walking the unit. Pain well controlled. Patient asking to go home tomorrow.     Objective   /74   Pulse 97   Temp 36.7 °C (98.1 °F)   Resp 17   Ht 1.753 m (5' 9\")   Wt 67.1 kg (147 lb 14.4 oz)   SpO2 97%   BMI 21.84 kg/m²   0-10 (Numeric) Pain Score: 0 - No pain   3 Day Weight Change: Unable to Calculate    Intake and Output    Intake/Output Summary (Last 24 hours) at 3/22/2025 1455  Last data filed at 3/22/2025 1023  Gross per 24 hour   Intake 970 ml   Output 1825 ml   Net -855 ml       Physical Exam  Vitals and nursing note reviewed.   Constitutional:       General: He is not in acute distress.  HENT:      Head: Normocephalic.      Nose: Nose normal.      Mouth/Throat:      Mouth: Mucous membranes are moist.   Eyes:      Conjunctiva/sclera: Conjunctivae normal.   Cardiovascular:      Rate and Rhythm: Normal rate and regular rhythm.      Pulses: Normal pulses.      Heart sounds: Normal heart " sounds.      Comments: TELE: SR, rate 90 bpm, 70-90's  Ventricular wires capped  Pulmonary:      Effort: Pulmonary effort is normal. No respiratory distress.      Breath sounds: Normal breath sounds.      Comments: IS: 2.5 L    Abdominal:      General: Abdomen is flat. Bowel sounds are normal.      Palpations: Abdomen is soft.      Comments: Consuming 100% of diet  BM (+) 3/21   Genitourinary:     Comments: Voiding independently  Musculoskeletal:      Cervical back: Neck supple.      Right lower leg: No edema.      Left lower leg: No edema.   Skin:     General: Skin is warm and dry.      Capillary Refill: Capillary refill takes less than 2 seconds.      Comments: Chest tube site an thoracotomy site with dressing, clean, dry and intact   Neurological:      General: No focal deficit present.      Mental Status: He is alert and oriented to person, place, and time.   Psychiatric:         Mood and Affect: Mood normal.         Behavior: Behavior normal.       Medications  Scheduled medications  acetaminophen, 975 mg, oral, q8h  aspirin, 81 mg, oral, Daily  cholecalciferol, 2,000 Units, oral, Daily  cyanocobalamin, 1,000 mcg, oral, Daily  heparin (porcine), 5,000 Units, subcutaneous, q8h  metoprolol tartrate, 50 mg, oral, BID  oxygen, , inhalation, Continuous - 02/gases  polyethylene glycol, 17 g, oral, Daily  sennosides-docusate sodium, 2 tablet, oral, BID    Continuous medications   PRN medications  PRN medications: bisacodyl, naloxone, oxyCODONE, oxyCODONE    Labs  Results for orders placed or performed during the hospital encounter of 03/12/25 (from the past 24 hours)   Magnesium   Result Value Ref Range    Magnesium 1.86 1.60 - 2.40 mg/dL   CBC   Result Value Ref Range    WBC 4.8 4.4 - 11.3 x10*3/uL    nRBC 0.0 0.0 - 0.0 /100 WBCs    RBC 4.25 (L) 4.50 - 5.90 x10*6/uL    Hemoglobin 13.3 (L) 13.5 - 17.5 g/dL    Hematocrit 40.9 (L) 41.0 - 52.0 %    MCV 96 80 - 100 fL    MCH 31.3 26.0 - 34.0 pg    MCHC 32.5 32.0 - 36.0  g/dL    RDW 13.2 11.5 - 14.5 %    Platelets 155 150 - 450 x10*3/uL   Renal Function Panel   Result Value Ref Range    Glucose 119 (H) 74 - 99 mg/dL    Sodium 139 136 - 145 mmol/L    Potassium 4.0 3.5 - 5.3 mmol/L    Chloride 106 98 - 107 mmol/L    Bicarbonate 25 21 - 32 mmol/L    Anion Gap 12 10 - 20 mmol/L    Urea Nitrogen 12 6 - 23 mg/dL    Creatinine 0.75 0.50 - 1.30 mg/dL    eGFR >90 >60 mL/min/1.73m*2    Calcium 9.2 8.6 - 10.6 mg/dL    Phosphorus 2.5 2.5 - 4.9 mg/dL    Albumin 3.8 3.4 - 5.0 g/dL               IMAGING/ DIAGNOSTIC TESTING:  I have personally reviewed the following test result(s):      XR chest 2 views 03/21/2025  Impression  1.  Stable appearance of the chest with mild basilar atelectasis and  trace bilateral pleural effusions.       XR chest 1 view 03/20/2025  Impression  1.  No significant interval change.      Transthoracic Echo (TTE) Complete 03/20/2025  CONCLUSIONS:  1. Left ventricular ejection fraction is normal, by visual estimate at 60-65%.  2. Left ventricular cavity size is moderately dilated.  3. Abnormal septal motion consistent with post-operative status.  4. There is normal right ventricular global systolic function.  5. The left atrium is mildly dilated.  6. Status post mitral valve repair.  7. 3/18/2025 34mm CG annuloplasty band.  8. Right ventricular systolic pressure is within normal limits.      IMPRESSION & PLAN:  POD # 4 s/p MVr with Dr. Ennis and Dr. Vaca  - Increase activity/ ambulation; PT/OT  - Encourage IS, C/DB; respiratory therapy; wean O2 as alejandro   - Cardiac rehab referral   - Continue cardiac meds: ASA, BB  - Pain and anticonstipation meds  - 2v CXR 3/21  - Postop echo completed 3/20  - Cut epicardial wires prior to discharge   - Tele until discharge  - Optimize nutrition and electrolytes    Rhythm  - Tele: NSR rate 90 bpm, 70-90's; afib RVR last on evening of 3/20-21  - Continue BB, increase to 50 mg BID  - Adjust medications as tolerated    Acute Blood Loss  Anemia   Recent Labs     25  0742 25  1205 25  0635 25  0016 25  1443 25  0552 25  0812   HGB 13.3* 12.1* 12.0* 13.0* 13.8 14.1 14.8   HCT 40.9* 36.7* 35.4* 37.8* 41.0 41.4 43.8   - MV, PO Iron x1mo - not ordered  - Daily labs, transfuse as indicated    Thrombocytopenia - Resolved  Recent Labs     25  0742 25  1205 25  0635 25  0016 25  1443 25  0552 25  0812    118* 98* 102* 144* 139* 143*   - Etiology likely postop/CPB related  - Continue to trend with daily CBCs    Volume/Electrolyte Status: Preop wt Weight: 68.6 kg (151 lb 3.2 oz)   Vitals:    25 0500   Weight: 67.1 kg (147 lb 14.4 oz)   - Weights: 67.1, 67.8, 68.6  - 3/20: euvolemic, Kphos  - Adjust diuresis as needed for postop cardiac surgery hypervolemia  - Replete electrolytes for hypokalemia / hypomagnesemia / hypophosphatemia as needed; K replaced 3/21; 3/22 Mg replacement  - Daily weights and strict I&Os  - Daily RFP while admitted    Leukocytosis - Resolved  Recent Labs     25  0742 25  1205 25  0635 25  0016 25  1443 25  0552 25  0812   WBC 4.8 5.3 8.1 7.9 13.1* 3.8* 3.7*     Temp (36hrs), Av.8 °C (98.2 °F), Min:36.4 °C (97.5 °F), Max:37 °C (98.6 °F)     - Aggressive pulmonary hygiene  - Monitor for s/s infection  - Likely atelectasis/ postoperative in etiology  - Daily CBC to follow    VTE Prophylaxis: SCDs/TEDs, ambulation, SQ heparin  Code Status: Full Code    Dispo  - PT/OT recs low intensity  - Would benefit from homecare RN for cardiac surgery carepath  - Anticipate discharge tomorrow - patient wants to wait until tomorrow  - Will continue to assess discharge needs      Brenda Vega, APRN-CNP, APRN-CNS  Cardiac Surgery [facility]  [facility]  Team Phone [phone]    3/22/2025  2:55 PM

## 2025-03-22 NOTE — CARE PLAN
The patient's goals for the shift include      The clinical goals for the shift include pt will remain HDS through shift      Problem: Pain - Adult  Goal: Verbalizes/displays adequate comfort level or baseline comfort level  Outcome: Progressing     Problem: Safety - Adult  Goal: Free from fall injury  Outcome: Progressing     Problem: Fall/Injury  Goal: Not fall by end of shift  Outcome: Progressing  Goal: Be free from injury by end of the shift  Outcome: Progressing  Goal: Verbalize understanding of personal risk factors for fall in the hospital  Outcome: Progressing  Goal: Verbalize understanding of risk factor reduction measures to prevent injury from fall in the home  Outcome: Progressing  Goal: Use assistive devices by end of the shift  Outcome: Progressing  Goal: Pace activities to prevent fatigue by end of the shift  Outcome: Progressing     Problem: Skin  Goal: Decreased wound size/increased tissue granulation at next dressing change  Outcome: Progressing  Goal: Participates in plan/prevention/treatment measures  Outcome: Progressing  Goal: Prevent/manage excess moisture  Outcome: Progressing  Goal: Prevent/minimize sheer/friction injuries  Outcome: Progressing  Goal: Promote/optimize nutrition  Outcome: Progressing  Goal: Promote skin healing  Outcome: Progressing

## 2025-03-23 ENCOUNTER — PHARMACY VISIT (OUTPATIENT)
Dept: PHARMACY | Facility: CLINIC | Age: 65
End: 2025-03-23
Payer: COMMERCIAL

## 2025-03-23 VITALS
BODY MASS INDEX: 21.84 KG/M2 | HEIGHT: 69 IN | TEMPERATURE: 97.9 F | WEIGHT: 147.5 LBS | RESPIRATION RATE: 20 BRPM | SYSTOLIC BLOOD PRESSURE: 114 MMHG | HEART RATE: 91 BPM | DIASTOLIC BLOOD PRESSURE: 70 MMHG | OXYGEN SATURATION: 98 %

## 2025-03-23 LAB
ALBUMIN SERPL BCP-MCNC: 3.6 G/DL (ref 3.4–5)
ANION GAP SERPL CALC-SCNC: 11 MMOL/L (ref 10–20)
BUN SERPL-MCNC: 13 MG/DL (ref 6–23)
CALCIUM SERPL-MCNC: 8.9 MG/DL (ref 8.6–10.6)
CHLORIDE SERPL-SCNC: 105 MMOL/L (ref 98–107)
CO2 SERPL-SCNC: 27 MMOL/L (ref 21–32)
CREAT SERPL-MCNC: 0.79 MG/DL (ref 0.5–1.3)
EGFRCR SERPLBLD CKD-EPI 2021: >90 ML/MIN/1.73M*2
ERYTHROCYTE [DISTWIDTH] IN BLOOD BY AUTOMATED COUNT: 12.9 % (ref 11.5–14.5)
GLUCOSE SERPL-MCNC: 92 MG/DL (ref 74–99)
HCT VFR BLD AUTO: 39.2 % (ref 41–52)
HGB BLD-MCNC: 13.3 G/DL (ref 13.5–17.5)
MAGNESIUM SERPL-MCNC: 2 MG/DL (ref 1.6–2.4)
MCH RBC QN AUTO: 31.7 PG (ref 26–34)
MCHC RBC AUTO-ENTMCNC: 33.9 G/DL (ref 32–36)
MCV RBC AUTO: 93 FL (ref 80–100)
NRBC BLD-RTO: 0 /100 WBCS (ref 0–0)
PHOSPHATE SERPL-MCNC: 2.6 MG/DL (ref 2.5–4.9)
PLATELET # BLD AUTO: 163 X10*3/UL (ref 150–450)
POTASSIUM SERPL-SCNC: 4.1 MMOL/L (ref 3.5–5.3)
RBC # BLD AUTO: 4.2 X10*6/UL (ref 4.5–5.9)
SODIUM SERPL-SCNC: 139 MMOL/L (ref 136–145)
WBC # BLD AUTO: 4.4 X10*3/UL (ref 4.4–11.3)

## 2025-03-23 PROCEDURE — RXMED WILLOW AMBULATORY MEDICATION CHARGE

## 2025-03-23 PROCEDURE — 2500000004 HC RX 250 GENERAL PHARMACY W/ HCPCS (ALT 636 FOR OP/ED)

## 2025-03-23 PROCEDURE — 83735 ASSAY OF MAGNESIUM: CPT

## 2025-03-23 PROCEDURE — 36415 COLL VENOUS BLD VENIPUNCTURE: CPT

## 2025-03-23 PROCEDURE — 80069 RENAL FUNCTION PANEL: CPT

## 2025-03-23 PROCEDURE — 2500000001 HC RX 250 WO HCPCS SELF ADMINISTERED DRUGS (ALT 637 FOR MEDICARE OP): Performed by: NURSE PRACTITIONER

## 2025-03-23 PROCEDURE — 85027 COMPLETE CBC AUTOMATED: CPT

## 2025-03-23 RX ORDER — ACETAMINOPHEN 325 MG/1
650 TABLET ORAL EVERY 6 HOURS PRN
COMMUNITY
Start: 2025-03-23

## 2025-03-23 RX ORDER — ASPIRIN 81 MG/1
81 TABLET ORAL DAILY
COMMUNITY
Start: 2025-03-24 | End: 2026-03-24

## 2025-03-23 RX ORDER — METOPROLOL TARTRATE 50 MG/1
50 TABLET ORAL 2 TIMES DAILY
Qty: 60 TABLET | Refills: 0 | Status: SHIPPED | OUTPATIENT
Start: 2025-03-23 | End: 2026-03-23

## 2025-03-23 RX ORDER — POLYETHYLENE GLYCOL 3350 17 G/17G
17 POWDER, FOR SOLUTION ORAL DAILY PRN
COMMUNITY
Start: 2025-03-23

## 2025-03-23 RX ADMIN — HEPARIN SODIUM 5000 UNITS: 5000 INJECTION, SOLUTION INTRAVENOUS; SUBCUTANEOUS at 08:22

## 2025-03-23 RX ADMIN — ASPIRIN 81 MG: 81 TABLET, COATED ORAL at 08:22

## 2025-03-23 RX ADMIN — HEPARIN SODIUM 5000 UNITS: 5000 INJECTION, SOLUTION INTRAVENOUS; SUBCUTANEOUS at 00:47

## 2025-03-23 RX ADMIN — METOPROLOL TARTRATE 50 MG: 50 TABLET, FILM COATED ORAL at 08:22

## 2025-03-23 RX ADMIN — CHOLECALCIFEROL TAB 25 MCG (1000 UNIT) 2000 UNITS: 25 TAB at 08:22

## 2025-03-23 RX ADMIN — CYANOCOBALAMIN TAB 1000 MCG 1000 MCG: 1000 TAB at 08:22

## 2025-03-23 ASSESSMENT — COGNITIVE AND FUNCTIONAL STATUS - GENERAL
DAILY ACTIVITIY SCORE: 24
MOBILITY SCORE: 24

## 2025-03-23 ASSESSMENT — PAIN - FUNCTIONAL ASSESSMENT: PAIN_FUNCTIONAL_ASSESSMENT: 0-10

## 2025-03-23 ASSESSMENT — PAIN SCALES - GENERAL: PAINLEVEL_OUTOF10: 0 - NO PAIN

## 2025-03-23 NOTE — PROGRESS NOTES
Rodney Mccabe is a 64 y.o. male on day 11 of admission presenting with Mitral regurgitation, acute.    Patient cleared for discharge. Patient to return home [facility]. This writer call [facility]  - Maddy to make her aware of patient discharging, no response, phone rang until the call ended automatically    Discharge summary faxed to Maddy at [phone]     Juan Antonio Henning

## 2025-03-23 NOTE — SIGNIFICANT EVENT
Epicardial wires cut at skin level due to surgeon preference.  Patient instructed to notify radiology of retained epicardial wires prior to any MRI procedure, and to notify Dr Ennis of any visible wires or s/s infection.

## 2025-03-23 NOTE — PROGRESS NOTES
"CARDIAC SURGERY DAILY PROGRESS NOTE    Rodney Mccabe is a 63 y/o male with PMH severe Mitral Regurgitation, MVP, non-obstructive CAD, treated Hep C, B Cell lymphoma 2004 and recurrence in 2017 treated with radiation, former smoker. Patient admitted on 3/12 in preparation for cardiac surgery. At time of admission patient is asymptomatic. No chest pain, shortness of breath. Plan for MV procedure with Dr. Ennis on 3/18.     OPERATION/PROCEDURE:   ROBOTIC MITRAL VALVE REPAIR USING SIZE 34MM CG FUTURE ANNULOPLASTY BAND; RIGHT GROIN CUTDOWN UING INTRACLUDE BALLOON  43722 - [place] VLVP MITRAL VALVE W/CARD BYP W/PROSTC RING  .  Cut down right groin for direct right femoral artery & right femoral vein cannulation  Right mini thoracotomy  Mitral valve repair with 34mm CG Future Band & 2 gortex chordae with Kimmie Ennis MD on 3/18/25    CTICU Course: Uneventful    Transferred to T3 on 3/19/25    ====================================    Interval History:   No Afib overnight. No other overnight issues.     SUBJECTIVE:  Feels well. Walking the unit. Pain well controlled. Agreeable to going home today      Objective   /70   Pulse 91   Temp 36.6 °C (97.9 °F)   Resp 20   Ht 1.753 m (5' 9\")   Wt 66.9 kg (147 lb 8 oz)   SpO2 98%   BMI 21.78 kg/m²   0-10 (Numeric) Pain Score: 0 - No pain   3 Day Weight Change: -0.816 kg (-1 lb 12.8 oz) per day    Intake and Output    Intake/Output Summary (Last 24 hours) at 3/23/2025 1129  Last data filed at 3/23/2025 1105  Gross per 24 hour   Intake 960 ml   Output 1625 ml   Net -665 ml       Physical Exam  Vitals and nursing note reviewed.   Constitutional:       General: He is not in acute distress.  HENT:      Mouth/Throat:      Mouth: Mucous membranes are moist.   Eyes:      Conjunctiva/sclera: Conjunctivae normal.   Cardiovascular:      Rate and Rhythm: Normal rate and regular rhythm.      Pulses: Normal pulses.      Heart sounds: Normal heart sounds.      Comments: TELE: SR, rate 90 bpm, " 70-90's  CUT wires 3/23  Pulmonary:      Effort: Pulmonary effort is normal. No respiratory distress.      Breath sounds: Normal breath sounds.   Abdominal:      General: Abdomen is flat. Bowel sounds are normal.      Palpations: Abdomen is soft.      Comments: BM (+) 3/21   Genitourinary:     Comments: Voiding independently  Musculoskeletal:      Right lower leg: No edema.      Left lower leg: No edema.   Skin:     General: Skin is warm and dry.      Capillary Refill: Capillary refill takes less than 2 seconds.      Comments: robotic/mini thoracotomy incisions and right groin incision C/D/I, well approximated, no s/s infection    Neurological:      General: No focal deficit present.      Mental Status: He is alert and oriented to person, place, and time.   Psychiatric:         Mood and Affect: Mood normal.         Behavior: Behavior normal.         Medications  Scheduled medications  acetaminophen, 975 mg, oral, q8h  aspirin, 81 mg, oral, Daily  cholecalciferol, 2,000 Units, oral, Daily  cyanocobalamin, 1,000 mcg, oral, Daily  heparin (porcine), 5,000 Units, subcutaneous, q8h  metoprolol tartrate, 50 mg, oral, BID  oxygen, , inhalation, Continuous - 02/gases  polyethylene glycol, 17 g, oral, Daily  sennosides-docusate sodium, 2 tablet, oral, BID    Continuous medications   PRN medications  PRN medications: bisacodyl, naloxone, oxyCODONE    Labs  Results for orders placed or performed during the hospital encounter of 03/12/25 (from the past 24 hours)   Magnesium   Result Value Ref Range    Magnesium 2.00 1.60 - 2.40 mg/dL   CBC   Result Value Ref Range    WBC 4.4 4.4 - 11.3 x10*3/uL    nRBC 0.0 0.0 - 0.0 /100 WBCs    RBC 4.20 (L) 4.50 - 5.90 x10*6/uL    Hemoglobin 13.3 (L) 13.5 - 17.5 g/dL    Hematocrit 39.2 (L) 41.0 - 52.0 %    MCV 93 80 - 100 fL    MCH 31.7 26.0 - 34.0 pg    MCHC 33.9 32.0 - 36.0 g/dL    RDW 12.9 11.5 - 14.5 %    Platelets 163 150 - 450 x10*3/uL   Renal Function Panel   Result Value Ref Range     Glucose 92 74 - 99 mg/dL    Sodium 139 136 - 145 mmol/L    Potassium 4.1 3.5 - 5.3 mmol/L    Chloride 105 98 - 107 mmol/L    Bicarbonate 27 21 - 32 mmol/L    Anion Gap 11 10 - 20 mmol/L    Urea Nitrogen 13 6 - 23 mg/dL    Creatinine 0.79 0.50 - 1.30 mg/dL    eGFR >90 >60 mL/min/1.73m*2    Calcium 8.9 8.6 - 10.6 mg/dL    Phosphorus 2.6 2.5 - 4.9 mg/dL    Albumin 3.6 3.4 - 5.0 g/dL               IMAGING/ DIAGNOSTIC TESTING:  I have personally reviewed the following test result(s):      XR chest 2 views 03/21/2025  Impression  1.  Stable appearance of the chest with mild basilar atelectasis and  trace bilateral pleural effusions.       XR chest 1 view 03/20/2025  Impression  1.  No significant interval change.      Transthoracic Echo (TTE) Complete 03/20/2025  CONCLUSIONS:  1. Left ventricular ejection fraction is normal, by visual estimate at 60-65%.  2. Left ventricular cavity size is moderately dilated.  3. Abnormal septal motion consistent with post-operative status.  4. There is normal right ventricular global systolic function.  5. The left atrium is mildly dilated.  6. Status post mitral valve repair.  7. 3/18/2025 34mm CG annuloplasty band.  8. Right ventricular systolic pressure is within normal limits.      IMPRESSION & PLAN:  POD # 5 s/p MVr with Dr. Ennis and Dr. Vaca  - Increase activity/ ambulation; PT/OT  - Encourage IS, C/DB; respiratory therapy; wean O2 as alejnadro   - Cardiac rehab referral   - Continue cardiac meds: ASA, BB  - Pain and anticonstipation meds  - 2v CXR 3/21  - Postop echo completed 3/20  - Cut epicardial wires 3/23   - Tele until discharge  - Optimize nutrition and electrolytes    Rhythm  - Tele: NSR rate 90 bpm, 70-90's; afib RVR last on evening of 3/20-21  - Continue metoprolol 50 mg BID  - Adjust medications as tolerated    Acute Blood Loss Anemia   Recent Labs     03/23/25  0733 03/22/25  0742 03/21/25  1205 03/20/25  0635 03/19/25  0016 03/18/25  1443 03/17/25  0552   HGB 13.3*  13.3* 12.1* 12.0* 13.0* 13.8 14.1   HCT 39.2* 40.9* 36.7* 35.4* 37.8* 41.0 41.4   - Daily labs, transfuse as indicated    Thrombocytopenia - Resolved  Recent Labs     25  0733 25  0742 25  1205 25  0635 25  0016 25  1443 25  0552    155 118* 98* 102* 144* 139*   - Etiology likely postop/CPB related  - Continue to trend with daily CBCs    Volume/Electrolyte Status: Preop wt Weight: 68.6 kg (151 lb 3.2 oz)   Vitals:    25 0500   Weight: 66.9 kg (147 lb 8 oz)   - Weights: 66.9, 67.1, 67.8, 68.6  - 3/20: euvolemic, Kphos  - Adjust diuresis as needed for postop cardiac surgery hypervolemia  - Replete electrolytes for hypokalemia / hypomagnesemia / hypophosphatemia as needed; K replaced 3/21; 3/22 Mg replacement  - Daily weights and strict I&Os  - Daily RFP while admitted    Leukocytosis - Resolved  Recent Labs     25  0733 25  0742 25  1205 25  0635 25  0016 25  1443 25  0552   WBC 4.4 4.8 5.3 8.1 7.9 13.1* 3.8*     Temp (36hrs), Av.8 °C (98.3 °F), Min:36.4 °C (97.5 °F), Max:37.3 °C (99.1 °F)     - Aggressive pulmonary hygiene  - Monitor for s/s infection  - Likely atelectasis/ postoperative in etiology    VTE Prophylaxis: SCDs/TEDs, ambulation, SQ heparin  Code Status: Full Code    Dispo  - PT/OT recs low intensity  - Would benefit from homecare RN for cardiac surgery carepath; [facility]  - Anticipate discharge today  - Will continue to assess discharge needs      JORDI Montesinos-CNP  Cardiac Surgery [facility]  [facility]  Team Phone [phone]    3/23/2025  11:29 AM
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
## 2025-03-28 DIAGNOSIS — I34.0 MITRAL VALVE INSUFFICIENCY, UNSPECIFIED ETIOLOGY: ICD-10-CM

## 2025-03-28 DIAGNOSIS — Z98.890 S/P MVR (MITRAL VALVE REPAIR): ICD-10-CM

## 2025-03-28 NOTE — DOCUMENTATION CLARIFICATION NOTE
PATIENT:               DEVEN PARDO  ACCT #:                  [id number]  MRN:                       [id number]  :                       1960  ADMIT DATE:       3/12/2025 3:23 PM  DISCH DATE:        3/23/2025 12:20 PM  RESPONDING PROVIDER #:        [id number]          PROVIDER RESPONSE TEXT:    Patient had ruptured chordae tendineae.    CDI QUERY TEXT:    Clarification    Instruction:    Based on your assessment of the patient and the clinical information, please provide the requested documentation by clicking on the appropriate radio button and enter any additional information if prompted.    Question: Please further clarify the patient's mitral valve repair with gore-wallace chords    When answering this query, please exercise your independent professional judgment. The fact that a question is being asked, does not imply that any particular answer is desired or expected.    The patient's clinical indicators include:  Clinical Information: Pt is a 64 year old male who presented with mitral valve regurgitation; s/p mitral valve repair on 3/18/25.    Clinical Indicators: OP report states ?Minimally invasive robotic assisted mitral valve repair (complex, 2 pairs of [place]-Wallace chords and annuloplasty using a 34 mm CG Future anuloplasty band). Examination of the mitral valve revealed prolapse of the P2 segment of the posterior leaflet. This was repaired using 2 pairs of [place]-Wall[place] chords and 34 mm CG Future band was secured using 9 X 2-0 Ethibond sutures.?    PN 3/20 ?Mitral valve repair with 34mm CG Future Band & 2 gortex chordae with Kimmie Ennis MD?    Treatment: mitral valve repair    Risk Factors: mitral valve insufficiency with prolapse  Options provided:  -- Patient had ruptured chordae tendineae.  -- Patient did not have ruptured chordae tendineae.  -- Other - I will add my own diagnosis  -- Refer to Clinical Documentation Reviewer    Query created by: Mimi Albarado on 3/21/2025 1:08 PM      Electronically  signed by:  CHRISTELLE GRIFFIN MD PhD 3/28/2025 8:07 AM

## 2025-04-03 ENCOUNTER — TELEMEDICINE CLINICAL SUPPORT (OUTPATIENT)
Dept: CARDIAC SURGERY | Facility: HOSPITAL | Age: 65
End: 2025-04-03
Payer: OTHER GOVERNMENT

## 2025-04-03 NOTE — PROGRESS NOTES
A telephone visit (audio only) between Rodney Mccabe (at the originating site) and the provider (at the distant site) was utilized to provide this telehealth service.    Patient is having a telehealth nurse visit today following hospital discharge on March 23, 2025.    Patient is 16 days s/p robotic mitral valve repair with Dr. Ennis and Dr. Vaca.  He feels well and has minimal incision discomfort.  Incisions and chest tube sites are closed without redness or drainage.  He denies shortness of breath and ambulates without difficulty.  He has no edema in his lower extremities.  Patient's appetite is good and no issues reported with constipation or diarrhea.   Patient is taking all medications as prescribed.  Patient advised to adhere to restrictions until his post-op visit with Dr. Ennis on April 21st, which he requested by a virtual visit.  [facility] and he saw his PCP yesterday.  Patient will call our office with any questions or concerns.  It was a pleasure speaking to Rodney Mccabe today.

## 2025-04-11 LAB
ACT BLD: 104 SEC (ref 82–174)
ACT BLD: 115 SEC (ref 82–174)
ACT BLD: 469 SEC (ref 82–174)
ACT BLD: 586 SEC (ref 82–174)
ACT BLD: 644 SEC (ref 82–174)
ACT BLD: 709 SEC (ref 82–174)
ACT BLD: 878 SEC (ref 82–174)

## 2025-04-14 NOTE — PROGRESS NOTES
[facility]      Chief Complaint:  Post-op evaluation    A virtual between the patient (at the originating site) and the provider (at the distant site) was utilized to provide this telehealth service.        HPI:    Reviewed at the clinic today.  Patient underwent Robotic Mitral Valve Repair on 3/18/25.     Patient has resumed normal activities and appetite has returned to normal.  No chest pain, no shortness of breath and denies palpitations, dizziness, or syncope.      On examination, wounds have soundly healed.            Assessment/Plan:      Referral in place for cardiac rehab  Ok to drive, ok to return to normal activities   Continue to follow up in the long term with cardiology   Does not need to return to clinic, but was instructed to call if any issues arise       ____________________________________________________________  Kimmie Ennis MD  Assistant Professor of Surgery  Division of Cardiac Surgery    [facility]  [facility]

## 2025-04-21 ENCOUNTER — TELEMEDICINE (OUTPATIENT)
Dept: CARDIAC SURGERY | Facility: HOSPITAL | Age: 65
End: 2025-04-21
Payer: OTHER GOVERNMENT

## 2025-04-21 DIAGNOSIS — Z98.890 S/P MITRAL VALVE REPAIR: Primary | ICD-10-CM

## (undated) DEVICE — LUBRICANT, ELECTROLUBE, F/ELECTRODE TIPS

## (undated) DEVICE — DRAPE, INSTRUMENT, W/POUCH, STERI DRAPE, 7 X 11 IN, DISPOSABLE, STERILE

## (undated) DEVICE — TUBING PACK, OXYGENATOR, ADULT

## (undated) DEVICE — CLIPPER, SURGICAL BLADE ASSEMBLY, GENERAL PURPOSE, SINGLE USE

## (undated) DEVICE — SUTURE, PROLENE, 5-0, 30 IN, RB2, DA, BLUE

## (undated) DEVICE — DRESSING, ISLAND, TELFA, 4 X 5 IN

## (undated) DEVICE — TR BAND, RADIAL COMPRESSION, STANDARD, 24CM

## (undated) DEVICE — NITINOL KIT, GLIDESHEATH, 6FR

## (undated) DEVICE — SHUNT, SENSOR

## (undated) DEVICE — CAUTERY SPATULA, PERMANENT

## (undated) DEVICE — DRIVER, NEEDLE, MEGA SUTURE CUT, DAVINCI XI

## (undated) DEVICE — SUTURE, PROLENE, 4-0, 36 IN, RB1, DA, BLUE

## (undated) DEVICE — TUBING, SUCTION, CONNECTING, NON-CONDUCTIVE, SURE GRIP CONNECTORS, 3/16 X 18 IN, PVC

## (undated) DEVICE — Device

## (undated) DEVICE — COVER, CART, 45 X 27 X 48 IN, CLEAR

## (undated) DEVICE — FORCEPS, CADIERE, DAVINCI XI

## (undated) DEVICE — CONNECTOR, W/O PARTING LINE, 0.25 X 0.375 IN

## (undated) DEVICE — DRAPE, FLUID WARMER

## (undated) DEVICE — SEAL, UNIVERSAL 5-8MM  XI

## (undated) DEVICE — CATHETER, ANGIO, IMPULSE, FL3.5, 6 FR X 100 CM

## (undated) DEVICE — DRAPE, COLUMN, DAVINCI XI

## (undated) DEVICE — ELECTRODE, QUICK-COMBO, EDGE SYSTEM, REDI PACK

## (undated) DEVICE — TIP,  ELECTRODE COATED INSULATED, EXTENDED, LF

## (undated) DEVICE — PORT, 5MM THORACOPORT SOFT

## (undated) DEVICE — CONNECTOR, Y, CARDIOPULMONARY, 0.375 X 0.25 X 0.25 IN

## (undated) DEVICE — DRAPE, ARM XI

## (undated) DEVICE — GUIDEWIRE, INQWIRE, 3MM J, .035 X 210CM, FIXED

## (undated) DEVICE — CASSETTE, BLOOD, PLEGIC SET

## (undated) DEVICE — CARE KIT, LAPAROSCOPIC, ADVANCED

## (undated) DEVICE — SPONGE, HEMOSTAT, SURGICEL FIBRILLAR, ABS, 4 X 4, LF

## (undated) DEVICE — TUBING, 0.375 X 3/32 IN X 6 FT

## (undated) DEVICE — ADAPTER, CARDIOPLEGIA, VENTING, Y, 19.1 CM

## (undated) DEVICE — CONNECTOR, Y, 0.375 X 0.375 X 0.5 IN

## (undated) DEVICE — OXYGENATOR FX 25, W/HR, ARTERIAL FILTER

## (undated) DEVICE — COVER, TIP HOT SHEARS ENDOWRIST

## (undated) DEVICE — MANIFOLD, 4 PORT NEPTUNE STANDARD

## (undated) DEVICE — CANNULA, AORTIC ROOT, LONG

## (undated) DEVICE — SUTURE, ETHIBOND, 5, 30 IN, V40, MULTIPACK, GREEN

## (undated) DEVICE — DRESSING, MEPILEX, BORDER, HEEL, 8.7 X 9.1 IN

## (undated) DEVICE — CANNULA, CARDIAC SUMP

## (undated) DEVICE — PUMP, STRYKERFLOW 2 & HANDPIECE W/10FT. IRRIGATION TUBING

## (undated) DEVICE — SUTURE, MONOCRYL, 3-0, 18 IN, PS2, UNDYED

## (undated) DEVICE — TRAY, SURESTEP, URINE METER, 14FR, SILICONE

## (undated) DEVICE — PLEDGET, PTFE, SOFT, LARGE, 3/8 X 3/16 X 1/16 IN

## (undated) DEVICE — CONNECTOR, STRAIGHT, 0.375 X 0.375 IN

## (undated) DEVICE — MARKER, SKIN, DUAL TIP INK W/9 LABEL AND REMOVABLE TIME OUT SLEEVE

## (undated) DEVICE — MICROCOAGULATION TEST, ACT+ TEST CUVETTE

## (undated) DEVICE — SCISSORS, MONOPOLAR, CURVED, 8MM

## (undated) DEVICE — CATHETER, DRAINAGE, NASOGASTRIC, DOUBLE LUMEN, FUNNEL END, SUMP, SALEM, 18 FR, 48 IN, PVC, STERILE

## (undated) DEVICE — CAUTERY, PENCIL, PUSH BUTTON, SMOKE EVAC, 70MM

## (undated) DEVICE — DRESSING, MEPILEX, BORDER, SACRUM, 8.7 X 9.8 IN

## (undated) DEVICE — TUBE SET, PNEUMOCLEAR, SMOKE EVACU, HIGH-FLOW

## (undated) DEVICE — ELECTRODE, ELECTROSURGICAL, BLADE, INSULATED, ENT/IMA, STERILE

## (undated) DEVICE — GRASPING RETRACTOR, SMALL GRAPTOR DAVINCI XI

## (undated) DEVICE — GOWN, SURGICAL, SMARTGOWN, XLARGE, STERILE

## (undated) DEVICE — DRAPE, SHEET, CARDIOVASCULAR, ANTIMICROBIAL, W/ANESTHESIA SCREEN, IOBAN 2, STERI DRAPE, 107 X 133 IN, DISPOSABLE, FABRIC, BLUE, STERILE

## (undated) DEVICE — CANNULA, BIOMEDICUS 25FR, FEMORAL VENOUS

## (undated) DEVICE — KIT, MIS COR-KNOT COMBO

## (undated) DEVICE — SUTURE, PROLENE 4-0, TAPER POINT, SH-1 BLUE 30 INCH

## (undated) DEVICE — PACING CABLE, EXTENSION, 12 FT BEIGE, DISPOSABLE

## (undated) DEVICE — DRESSING, ADHESIVE, ISLAND, TELFA, 2 X 3.75 IN, LF

## (undated) DEVICE — APPLICATOR, CHLORAPREP, W/ORANGE TINT, 26ML

## (undated) DEVICE — TUBING, SMOKE EVAC, 3/8 X 10 FT

## (undated) DEVICE — COLLECTION UNIT, DRAINAGE, THORACIC, SINGLE TUBE, DRY SUCTION, ATS COMPATIBLE, OASIS 3600, LF

## (undated) DEVICE — PAD, ELECTRODE DEFIB PADPRO ADULT STRL W/ADAPTER

## (undated) DEVICE — COUNTER, NEEDLE, FOAM BLOCK, POP-N-COUNT, W/BLADEGUARD, W/ADHESIVE 40 COUNT, RED

## (undated) DEVICE — CATHETER, ANGIO, IMPULSE, FR4, 6 FR X 100 CM